# Patient Record
Sex: MALE | Race: WHITE | Employment: OTHER | URBAN - METROPOLITAN AREA
[De-identification: names, ages, dates, MRNs, and addresses within clinical notes are randomized per-mention and may not be internally consistent; named-entity substitution may affect disease eponyms.]

---

## 2023-03-17 DIAGNOSIS — N40.1 BENIGN PROSTATIC HYPERPLASIA WITH LOWER URINARY TRACT SYMPTOMS, SYMPTOM DETAILS UNSPECIFIED: ICD-10-CM

## 2023-03-17 DIAGNOSIS — I10 HYPERTENSION, UNSPECIFIED TYPE: Primary | ICD-10-CM

## 2023-03-17 DIAGNOSIS — K21.9 GASTROESOPHAGEAL REFLUX DISEASE, UNSPECIFIED WHETHER ESOPHAGITIS PRESENT: ICD-10-CM

## 2023-03-24 RX ORDER — VALSARTAN AND HYDROCHLOROTHIAZIDE 320; 12.5 MG/1; MG/1
TABLET, FILM COATED ORAL
Qty: 90 TABLET | Refills: 3 | Status: SHIPPED | OUTPATIENT
Start: 2023-03-24

## 2023-03-24 RX ORDER — OMEPRAZOLE 20 MG/1
CAPSULE, DELAYED RELEASE ORAL
Qty: 90 CAPSULE | Refills: 3 | Status: SHIPPED | OUTPATIENT
Start: 2023-03-24 | End: 2024-01-18

## 2023-03-24 RX ORDER — TAMSULOSIN HYDROCHLORIDE 0.4 MG/1
CAPSULE ORAL
Qty: 90 CAPSULE | Refills: 3 | Status: SHIPPED | OUTPATIENT
Start: 2023-03-24 | End: 2023-10-16

## 2023-05-01 ENCOUNTER — OFFICE VISIT (OUTPATIENT)
Dept: PRIMARY CARE | Facility: CLINIC | Age: 78
End: 2023-05-01
Payer: MEDICARE

## 2023-05-01 VITALS — WEIGHT: 196 LBS | SYSTOLIC BLOOD PRESSURE: 160 MMHG | DIASTOLIC BLOOD PRESSURE: 80 MMHG

## 2023-05-01 DIAGNOSIS — I10 HYPERTENSION, UNSPECIFIED TYPE: ICD-10-CM

## 2023-05-01 DIAGNOSIS — Z86.73 HX OF TIA (TRANSIENT ISCHEMIC ATTACK) AND STROKE: ICD-10-CM

## 2023-05-01 DIAGNOSIS — E11.69 TYPE 2 DIABETES MELLITUS WITH OTHER SPECIFIED COMPLICATION, WITHOUT LONG-TERM CURRENT USE OF INSULIN (MULTI): ICD-10-CM

## 2023-05-01 DIAGNOSIS — G45.9 TIA (TRANSIENT ISCHEMIC ATTACK): Primary | ICD-10-CM

## 2023-05-01 PROCEDURE — 1160F RVW MEDS BY RX/DR IN RCRD: CPT | Performed by: INTERNAL MEDICINE

## 2023-05-01 PROCEDURE — 3077F SYST BP >= 140 MM HG: CPT | Performed by: INTERNAL MEDICINE

## 2023-05-01 PROCEDURE — 99214 OFFICE O/P EST MOD 30 MIN: CPT | Performed by: INTERNAL MEDICINE

## 2023-05-01 PROCEDURE — 1036F TOBACCO NON-USER: CPT | Performed by: INTERNAL MEDICINE

## 2023-05-01 PROCEDURE — 1159F MED LIST DOCD IN RCRD: CPT | Performed by: INTERNAL MEDICINE

## 2023-05-01 PROCEDURE — 3079F DIAST BP 80-89 MM HG: CPT | Performed by: INTERNAL MEDICINE

## 2023-05-01 RX ORDER — EMPAGLIFLOZIN 10 MG/1
1 TABLET, FILM COATED ORAL DAILY
COMMUNITY
Start: 2019-06-24

## 2023-05-01 RX ORDER — EZETIMIBE 10 MG/1
10 TABLET ORAL NIGHTLY
COMMUNITY
Start: 2023-02-19

## 2023-05-01 RX ORDER — APIXABAN 5 MG/1
1 TABLET, FILM COATED ORAL 2 TIMES DAILY
COMMUNITY
Start: 2013-05-03 | End: 2023-12-12

## 2023-05-01 RX ORDER — GLIMEPIRIDE 2 MG/1
2 TABLET ORAL DAILY
COMMUNITY
Start: 2015-04-07 | End: 2023-06-19 | Stop reason: DRUGHIGH

## 2023-05-01 RX ORDER — BLOOD SUGAR DIAGNOSTIC
STRIP MISCELLANEOUS
COMMUNITY
End: 2023-06-16 | Stop reason: SDUPTHER

## 2023-05-01 RX ORDER — ROSUVASTATIN CALCIUM 20 MG/1
1 TABLET, COATED ORAL NIGHTLY
COMMUNITY
Start: 2018-07-17

## 2023-05-01 RX ORDER — ATENOLOL 50 MG/1
1 TABLET ORAL NIGHTLY
COMMUNITY
Start: 2017-02-27 | End: 2023-08-08

## 2023-05-01 ASSESSMENT — ENCOUNTER SYMPTOMS
DEPRESSION: 0
LOSS OF SENSATION IN FEET: 0
OCCASIONAL FEELINGS OF UNSTEADINESS: 0

## 2023-05-01 NOTE — PROGRESS NOTES
Subjective   Patient ID: Evangelist Infante is a 77 y.o. male who presents for Cerebrovascular Accident (Patient states last Friday at dinner his daughter stated his face was drooping on the right side.).    HPI   77 years old male comes in to see me with the following complaint.  On Friday night he was eating at the restaurant with his daughter and drinking glass of vodka, at the end of the day he felt fine and try to stand up and walk to leave but he had to struggle and his daughter noticed left facial drooping.  He was able to speak did not lose his speech.  Did not feel obviously tired or weak.  This event happened twice in a month so far.  He went home which is 5 minutes from the restaurant and he felt normal and back to his baseline.  Acute care Evangelist for his hypertension, atrial fibrillation, diabetes mellitus type 2, hyperlipidemia and BPH.  Review of Systems  12 system reviewed and 12 systems are negative.  Still feeling weak or different feeling on his left side which is the side of the stroke.  He has normal speech.  He denies any headache or lightheadedness.  No new neurological symptoms since that event on Friday night few days ago.  Objective   /80 (BP Location: Right arm, Patient Position: Sitting, BP Cuff Size: Adult)   Wt 88.9 kg (196 lb)     Physical Exam  Was alert oriented in no distress his usual way.  Nonicteric sclera or jaundice.  Face symmetrical cranial nerves intact neck supple no masses no lymph no thyromegaly or jugular venous distention.  Lungs clear no rales wheezing or crackles.  Heart normal S1 and S2 regular rhythm.  Abdomen benign neurologically the same equal strength on upper extremities right and left.  Weaker on the left leg.  Normal speech no cranial nerves deficits.  Good sensory system on both hands.  We agreed to follow the plan of consulting neurology.  Ordering an ultrasound of the carotids.  Possibly need a CAT scan or an MRI MRA.  Starting on aspirin 81 mg even  though he is taking Eliquis 5 mg twice a day and this happened after consulting with cardiology this morning.  It happened during his visit here in the office.  We were concerned about TIAs and he understands that.  Hopefully he can see neurology soon start the aspirin today.  81 mg.  Assessment/Plan   Diagnoses and all orders for this visit:  TIA (transient ischemic attack)  -     Referral to Neurology; Future  -     Vascular US carotid artery duplex bilateral; Future  Hx of TIA (transient ischemic attack) and stroke  -     Vascular US carotid artery duplex bilateral; Future  Type 2 diabetes mellitus with other specified complication, without long-term current use of insulin (CMS/[facility])  Hypertension, unspecified type

## 2023-06-14 DIAGNOSIS — E11.9 TYPE 2 DIABETES MELLITUS WITHOUT COMPLICATIONS (MULTI): ICD-10-CM

## 2023-06-16 DIAGNOSIS — E11.69 TYPE 2 DIABETES MELLITUS WITH OTHER SPECIFIED COMPLICATION, WITHOUT LONG-TERM CURRENT USE OF INSULIN (MULTI): Primary | ICD-10-CM

## 2023-06-16 RX ORDER — BLOOD SUGAR DIAGNOSTIC
STRIP MISCELLANEOUS
Qty: 100 STRIP | Refills: 3 | Status: SHIPPED | OUTPATIENT
Start: 2023-06-16 | End: 2023-10-16

## 2023-06-16 RX ORDER — BLOOD SUGAR DIAGNOSTIC
STRIP MISCELLANEOUS
Qty: 100 STRIP | Refills: 4 | Status: SHIPPED | OUTPATIENT
Start: 2023-06-16

## 2023-06-19 ENCOUNTER — TELEPHONE (OUTPATIENT)
Dept: PRIMARY CARE | Facility: CLINIC | Age: 78
End: 2023-06-19
Payer: MEDICARE

## 2023-06-19 DIAGNOSIS — E11.69 TYPE 2 DIABETES MELLITUS WITH OTHER SPECIFIED COMPLICATION, WITHOUT LONG-TERM CURRENT USE OF INSULIN (MULTI): Primary | ICD-10-CM

## 2023-06-19 RX ORDER — BLOOD SUGAR DIAGNOSTIC
100 STRIP MISCELLANEOUS 2 TIMES DAILY
COMMUNITY
Start: 2015-05-06

## 2023-06-19 RX ORDER — GLIMEPIRIDE 2 MG/1
2 TABLET ORAL 2 TIMES DAILY
COMMUNITY

## 2023-06-19 RX ORDER — GLIMEPIRIDE 2 MG/1
TABLET ORAL
Qty: 180 TABLET | Refills: 3 | Status: SHIPPED | OUTPATIENT
Start: 2023-06-19

## 2023-06-19 NOTE — TELEPHONE ENCOUNTER
VERY ANGRY MAN-- ADI TIRADO- 1945   HAS CALLED 4 TIMES FOR MEDICATION AND NEEDS SENT ASAP  1) ONE TOUCH ULTRA TEST STRIPS-100 TEST- 4 REFILLS  [facility], [phone]

## 2023-06-23 ENCOUNTER — TELEPHONE (OUTPATIENT)
Dept: PRIMARY CARE | Facility: CLINIC | Age: 78
End: 2023-06-23

## 2023-06-26 DIAGNOSIS — I10 HYPERTENSION, UNSPECIFIED TYPE: Primary | ICD-10-CM

## 2023-07-01 RX ORDER — ATENOLOL 100 MG/1
TABLET ORAL
Qty: 90 TABLET | Refills: 3 | Status: SHIPPED | OUTPATIENT
Start: 2023-07-01 | End: 2024-01-18

## 2023-08-07 DIAGNOSIS — I25.10 CORONARY ARTERY DISEASE INVOLVING NATIVE CORONARY ARTERY OF NATIVE HEART, UNSPECIFIED WHETHER ANGINA PRESENT: Primary | ICD-10-CM

## 2023-08-08 RX ORDER — ATENOLOL 50 MG/1
50 TABLET ORAL NIGHTLY
Qty: 90 TABLET | Refills: 3 | Status: SHIPPED | OUTPATIENT
Start: 2023-08-08 | End: 2024-01-18

## 2023-08-28 ENCOUNTER — TELEPHONE (OUTPATIENT)
Dept: PRIMARY CARE | Facility: CLINIC | Age: 78
End: 2023-08-28
Payer: MEDICARE

## 2023-08-28 DIAGNOSIS — I10 HYPERTENSION, UNSPECIFIED TYPE: ICD-10-CM

## 2023-08-28 RX ORDER — VALSARTAN 160 MG/1
160 TABLET ORAL DAILY
Qty: 90 TABLET | Refills: 3 | Status: SHIPPED | OUTPATIENT
Start: 2023-08-28 | End: 2023-10-17 | Stop reason: SDUPTHER

## 2023-09-05 ENCOUNTER — TELEPHONE (OUTPATIENT)
Dept: PRIMARY CARE | Facility: CLINIC | Age: 78
End: 2023-09-05
Payer: MEDICARE

## 2023-09-07 ENCOUNTER — OFFICE VISIT (OUTPATIENT)
Dept: PRIMARY CARE | Facility: CLINIC | Age: 78
End: 2023-09-07
Payer: MEDICARE

## 2023-09-07 VITALS
WEIGHT: 176.2 LBS | BODY MASS INDEX: 25.28 KG/M2 | TEMPERATURE: 96.8 F | SYSTOLIC BLOOD PRESSURE: 110 MMHG | DIASTOLIC BLOOD PRESSURE: 70 MMHG

## 2023-09-07 DIAGNOSIS — R19.7 DIARRHEA OF PRESUMED INFECTIOUS ORIGIN: Primary | ICD-10-CM

## 2023-09-07 DIAGNOSIS — A04.72 C. DIFFICILE DIARRHEA: ICD-10-CM

## 2023-09-07 DIAGNOSIS — L97.511 ULCER OF RIGHT FOOT, LIMITED TO BREAKDOWN OF SKIN (MULTI): ICD-10-CM

## 2023-09-07 DIAGNOSIS — D64.9 ANEMIA, UNSPECIFIED TYPE: ICD-10-CM

## 2023-09-07 DIAGNOSIS — I10 HYPERTENSION, UNSPECIFIED TYPE: ICD-10-CM

## 2023-09-07 DIAGNOSIS — R63.4 WEIGHT LOSS, ABNORMAL: ICD-10-CM

## 2023-09-07 PROCEDURE — 99214 OFFICE O/P EST MOD 30 MIN: CPT | Performed by: INTERNAL MEDICINE

## 2023-09-07 PROCEDURE — 1160F RVW MEDS BY RX/DR IN RCRD: CPT | Performed by: INTERNAL MEDICINE

## 2023-09-07 PROCEDURE — 80048 BASIC METABOLIC PNL TOTAL CA: CPT | Performed by: INTERNAL MEDICINE

## 2023-09-07 PROCEDURE — 1126F AMNT PAIN NOTED NONE PRSNT: CPT | Performed by: INTERNAL MEDICINE

## 2023-09-07 PROCEDURE — 3074F SYST BP LT 130 MM HG: CPT | Performed by: INTERNAL MEDICINE

## 2023-09-07 PROCEDURE — 3078F DIAST BP <80 MM HG: CPT | Performed by: INTERNAL MEDICINE

## 2023-09-07 PROCEDURE — 1159F MED LIST DOCD IN RCRD: CPT | Performed by: INTERNAL MEDICINE

## 2023-09-07 PROCEDURE — 1036F TOBACCO NON-USER: CPT | Performed by: INTERNAL MEDICINE

## 2023-09-07 PROCEDURE — 85025 COMPLETE CBC W/AUTO DIFF WBC: CPT | Performed by: INTERNAL MEDICINE

## 2023-09-07 ASSESSMENT — PATIENT HEALTH QUESTIONNAIRE - PHQ9
2. FEELING DOWN, DEPRESSED OR HOPELESS: NOT AT ALL
1. LITTLE INTEREST OR PLEASURE IN DOING THINGS: NOT AT ALL
SUM OF ALL RESPONSES TO PHQ9 QUESTIONS 1 AND 2: 0

## 2023-09-07 ASSESSMENT — PAIN SCALES - GENERAL: PAINLEVEL: 0-NO PAIN

## 2023-09-07 NOTE — PROGRESS NOTES
Subjective   Patient ID: Evangelist Infante is a 78 y.o. male who presents for Follow-up (Foot surgery ), Results, and Diarrhea.    HPI   78 years old male comes in to see me sitting in the wheelchair because of the recent surgery done on his right foot at [facility] by Dr. Oates.  He was treated with antibiotic for a while.  Now he is complaining of diarrhea which is persistent for few days.  His stools looks like most.  He complained of diarrhea as he said and he is not taking any precaution when it comes to diet.  We spent time discussing the situation with him and his wife concerning clear liquid diet.  Explained to them that we need to check him for C. difficile and for that purpose we need a stool specimen call should at [facility].  Blood test was done for CBC and a BMP.  He lost a lot of weight.  According to him 15 pounds.  Review of Systems  Review of system -12 of them except for diarrhea and weight loss.  He goes to the podiatrist once a week for dressing change on his right foot.  He a quarter sized ulcer on his arch area right foot just below the right big toe    During his hospital stay at [place] there was a note stating they discovered a lung nodule.  His wife and himself states they found a nodule on his gallbladder.  He was supposed to see a surgeon Dr. Massey for that reason.  Medications are valsartan 160 mg a day.  Amlodipine 2.5 mg a day.  Eliquis 5 mg twice a day.  Atenolol 100 mg a day.  Atorvastatin 40 mg a day.  Omeprazole 20 mg a day and tamsulosin 0.4 mg a day.  Jardiance was put on hold and as we could see there was some changes in his medication.  Objective   /70   Temp 36 °C (96.8 °F) (Temporal)   Wt 79.9 kg (176 lb 3.2 oz)   BMI 25.28 kg/m²     Physical Exam  Alert oriented in no distress.  Lost weight visible on his face.  The dressing on his right foot.  Nonicteric sclera or jaundice.  Face symmetrical cranial nerves intact.  Lungs clear no rales no  wheezing.  Heart normal S1 and S2 regular rhythm.  Abdomen benign neurologically intact.  Neck supple no masses no lymph no thyromegaly or jugular venous distention.  Work-up for C. difficile toxin on the stools culture to be done at [facility] with the order written.  CBC and BMP done.  Assessment/Plan   Diagnoses and all orders for this visit:  Diarrhea of presumed infectious origin  -     C. difficile, PCR; Future  Anemia, unspecified type  -     CBC  Hypertension, unspecified type  -     Basic Metabolic Panel  C. difficile diarrhea  Ulcer of right foot, limited to breakdown of skin (CMS/HCC)  Weight loss, abnormal

## 2023-09-08 ENCOUNTER — LAB (OUTPATIENT)
Dept: LAB | Facility: LAB | Age: 78
End: 2023-09-08
Payer: MEDICARE

## 2023-09-08 DIAGNOSIS — R19.7 DIARRHEA OF PRESUMED INFECTIOUS ORIGIN: ICD-10-CM

## 2023-09-15 ENCOUNTER — TELEPHONE (OUTPATIENT)
Dept: PRIMARY CARE | Facility: CLINIC | Age: 78
End: 2023-09-15

## 2023-09-15 ENCOUNTER — LAB (OUTPATIENT)
Dept: LAB | Facility: LAB | Age: 78
End: 2023-09-15
Payer: MEDICARE

## 2023-09-15 DIAGNOSIS — A09 DIARRHEA OF INFECTIOUS ORIGIN: ICD-10-CM

## 2023-09-15 DIAGNOSIS — A09 DIARRHEA OF INFECTIOUS ORIGIN: Primary | ICD-10-CM

## 2023-09-15 DIAGNOSIS — A04.72 C. DIFFICILE DIARRHEA: Primary | ICD-10-CM

## 2023-09-15 PROCEDURE — 87493 C DIFF AMPLIFIED PROBE: CPT

## 2023-09-15 RX ORDER — VANCOMYCIN HYDROCHLORIDE 125 MG/1
125 CAPSULE ORAL 4 TIMES DAILY
Qty: 40 CAPSULE | Refills: 0 | Status: SHIPPED | OUTPATIENT
Start: 2023-09-15 | End: 2023-10-01 | Stop reason: SDUPTHER

## 2023-09-16 ENCOUNTER — DOCUMENTATION (OUTPATIENT)
Dept: PRIMARY CARE | Facility: CLINIC | Age: 78
End: 2023-09-16
Payer: MEDICARE

## 2023-09-16 LAB — C. DIFFICILE TOXIN, PCR: DETECTED

## 2023-09-16 NOTE — PROGRESS NOTES
I called the patient now has's C. difficile culture came back detected and positive.  Already put vancomycin 125 mg 4 times a day last night and the wife is now at the pharmacy picking it up he will start on it right away 4 times a day for 10 days and I will follow his progression you may eat anything you want to eat.  It is contagious.  Make sure you wipe clean your hands keep your silverware separate from the rest of the family and the children should stay away from you you are contagious as we speak careful with the wife and all the visitors clean the house disimpacted good luck

## 2023-09-25 DIAGNOSIS — E78.5 HYPERLIPIDEMIA, UNSPECIFIED HYPERLIPIDEMIA TYPE: Primary | ICD-10-CM

## 2023-09-25 RX ORDER — ATORVASTATIN CALCIUM 40 MG/1
40 TABLET, FILM COATED ORAL DAILY
Qty: 90 TABLET | Refills: 3 | Status: SHIPPED | OUTPATIENT
Start: 2023-09-25 | End: 2024-04-26

## 2023-10-01 DIAGNOSIS — A04.72 C. DIFFICILE DIARRHEA: ICD-10-CM

## 2023-10-01 RX ORDER — VANCOMYCIN HYDROCHLORIDE 125 MG/1
125 CAPSULE ORAL 4 TIMES DAILY
Qty: 40 CAPSULE | Refills: 1 | Status: SHIPPED | OUTPATIENT
Start: 2023-10-01 | End: 2023-10-24 | Stop reason: SDUPTHER

## 2023-10-15 DIAGNOSIS — E11.69 TYPE 2 DIABETES MELLITUS WITH OTHER SPECIFIED COMPLICATION, WITHOUT LONG-TERM CURRENT USE OF INSULIN (MULTI): ICD-10-CM

## 2023-10-15 DIAGNOSIS — N40.1 BENIGN PROSTATIC HYPERPLASIA WITH LOWER URINARY TRACT SYMPTOMS, SYMPTOM DETAILS UNSPECIFIED: ICD-10-CM

## 2023-10-16 RX ORDER — TAMSULOSIN HYDROCHLORIDE 0.4 MG/1
CAPSULE ORAL
Qty: 100 CAPSULE | Refills: 2 | Status: SHIPPED | OUTPATIENT
Start: 2023-10-16

## 2023-10-16 RX ORDER — BLOOD SUGAR DIAGNOSTIC
STRIP MISCELLANEOUS
Qty: 200 STRIP | Refills: 2 | Status: SHIPPED | OUTPATIENT
Start: 2023-10-16

## 2023-10-17 ENCOUNTER — TELEPHONE (OUTPATIENT)
Dept: PRIMARY CARE | Facility: CLINIC | Age: 78
End: 2023-10-17
Payer: MEDICARE

## 2023-10-17 DIAGNOSIS — I10 HYPERTENSION, UNSPECIFIED TYPE: ICD-10-CM

## 2023-10-18 RX ORDER — VALSARTAN 160 MG/1
160 TABLET ORAL DAILY
Qty: 90 TABLET | Refills: 3 | Status: SHIPPED | OUTPATIENT
Start: 2023-10-18 | End: 2024-10-17

## 2023-10-24 ENCOUNTER — TELEPHONE (OUTPATIENT)
Dept: PRIMARY CARE | Facility: CLINIC | Age: 78
End: 2023-10-24
Payer: MEDICARE

## 2023-10-24 DIAGNOSIS — A04.72 C. DIFFICILE DIARRHEA: ICD-10-CM

## 2023-10-24 NOTE — TELEPHONE ENCOUNTER
patient called requeted 10 day supply of Vancomycin for his C-Diff problem until he see Gastroenterologist

## 2023-10-25 RX ORDER — VANCOMYCIN HYDROCHLORIDE 125 MG/1
125 CAPSULE ORAL 4 TIMES DAILY
Qty: 40 CAPSULE | Refills: 0 | Status: SHIPPED | OUTPATIENT
Start: 2023-10-25 | End: 2023-11-04

## 2023-11-05 PROBLEM — N40.1 BENIGN PROSTATIC HYPERPLASIA (BPH) WITH STRAINING ON URINATION: Status: ACTIVE | Noted: 2023-11-05

## 2023-11-05 PROBLEM — G45.9 TIA (TRANSIENT ISCHEMIC ATTACK): Status: ACTIVE | Noted: 2023-11-05

## 2023-11-05 PROBLEM — K21.9 GASTROESOPHAGEAL REFLUX DISEASE: Status: ACTIVE | Noted: 2017-12-19

## 2023-11-05 PROBLEM — E08.621: Status: ACTIVE | Noted: 2017-12-06

## 2023-11-05 PROBLEM — S93.125A: Status: ACTIVE | Noted: 2017-11-15

## 2023-11-05 PROBLEM — L89.92: Status: ACTIVE | Noted: 2021-06-16

## 2023-11-05 PROBLEM — E11.9 DIABETES MELLITUS (MULTI): Status: ACTIVE | Noted: 2023-08-18

## 2023-11-05 PROBLEM — I48.91 ATRIAL FIBRILLATION (MULTI): Status: ACTIVE | Noted: 2017-12-19

## 2023-11-05 PROBLEM — I73.9 PVD (PERIPHERAL VASCULAR DISEASE) (CMS-HCC): Status: ACTIVE | Noted: 2023-11-05

## 2023-11-05 PROBLEM — M21.969 FOOT DEFORMITY: Status: ACTIVE | Noted: 2023-11-05

## 2023-11-05 PROBLEM — E78.00 HIGH CHOLESTEROL: Status: ACTIVE | Noted: 2023-11-05

## 2023-11-05 PROBLEM — I69.993 CVA, OLD, ATAXIA: Status: ACTIVE | Noted: 2023-11-05

## 2023-11-05 PROBLEM — R39.16 BENIGN PROSTATIC HYPERPLASIA (BPH) WITH STRAINING ON URINATION: Status: ACTIVE | Noted: 2023-11-05

## 2023-11-05 PROBLEM — I77.819 AORTIC DILATATION (CMS-HCC): Status: ACTIVE | Noted: 2023-11-05

## 2023-11-05 PROBLEM — D51.0 ANEMIA, PERNICIOUS: Status: ACTIVE | Noted: 2023-11-05

## 2023-11-05 PROBLEM — R26.2 DIFFICULTY WALKING: Status: ACTIVE | Noted: 2023-11-05

## 2023-11-05 PROBLEM — I10 BENIGN ESSENTIAL HYPERTENSION: Status: ACTIVE | Noted: 2017-12-19

## 2023-11-05 PROBLEM — I69.354 HEMIPARESIS OF LEFT NONDOMINANT SIDE AS LATE EFFECT OF CEREBRAL INFARCTION (MULTI): Status: ACTIVE | Noted: 2023-11-05

## 2023-11-05 PROBLEM — L97.412: Status: ACTIVE | Noted: 2017-12-06

## 2023-11-05 RX ORDER — AMLODIPINE BESYLATE 2.5 MG/1
2.5 TABLET ORAL
COMMUNITY

## 2023-11-07 ENCOUNTER — OFFICE VISIT (OUTPATIENT)
Dept: GASTROENTEROLOGY | Facility: HOSPITAL | Age: 78
End: 2023-11-07
Payer: MEDICARE

## 2023-11-07 VITALS — BODY MASS INDEX: 26.46 KG/M2 | HEIGHT: 71 IN | WEIGHT: 189 LBS

## 2023-11-07 DIAGNOSIS — A04.72 CLOSTRIDIUM DIFFICILE DIARRHEA: Primary | ICD-10-CM

## 2023-11-07 PROCEDURE — 1126F AMNT PAIN NOTED NONE PRSNT: CPT

## 2023-11-07 PROCEDURE — 99203 OFFICE O/P NEW LOW 30 MIN: CPT

## 2023-11-07 PROCEDURE — 1160F RVW MEDS BY RX/DR IN RCRD: CPT

## 2023-11-07 PROCEDURE — 99213 OFFICE O/P EST LOW 20 MIN: CPT

## 2023-11-07 PROCEDURE — 3074F SYST BP LT 130 MM HG: CPT

## 2023-11-07 PROCEDURE — 1036F TOBACCO NON-USER: CPT

## 2023-11-07 PROCEDURE — 3078F DIAST BP <80 MM HG: CPT

## 2023-11-07 PROCEDURE — 1159F MED LIST DOCD IN RCRD: CPT

## 2023-11-07 RX ORDER — VANCOMYCIN HYDROCHLORIDE 125 MG/1
125 CAPSULE ORAL 4 TIMES DAILY
Qty: 40 CAPSULE | Refills: 0 | Status: SHIPPED | OUTPATIENT
Start: 2023-11-07 | End: 2023-11-17 | Stop reason: SDUPTHER

## 2023-11-07 RX ORDER — SYRING-NEEDL,DISP,INSUL,0.3 ML 29 G X1/2"
296 SYRINGE, EMPTY DISPOSABLE MISCELLANEOUS ONCE
Qty: 296 ML | Refills: 0 | Status: SHIPPED | OUTPATIENT
Start: 2023-11-07 | End: 2023-11-08

## 2023-11-07 RX ORDER — FECAL MICROBIOTA SPORES, LIVE-BRPK 30000000 [CFU]/1
1 CAPSULE ORAL 4 TIMES DAILY
Qty: 12 CAPSULE | Refills: 0 | Status: SHIPPED | OUTPATIENT
Start: 2023-11-07 | End: 2023-11-08

## 2023-11-07 ASSESSMENT — ENCOUNTER SYMPTOMS
APPETITE CHANGE: 0
CONSTIPATION: 0
NAUSEA: 0
RECTAL PAIN: 0
FEVER: 0
VOMITING: 0
BLOOD IN STOOL: 0
COUGH: 0
ANAL BLEEDING: 0
CHILLS: 0
DIARRHEA: 1
FATIGUE: 0
ABDOMINAL DISTENTION: 0
TROUBLE SWALLOWING: 0
ABDOMINAL PAIN: 0
SHORTNESS OF BREATH: 0

## 2023-11-07 NOTE — PATIENT INSTRUCTIONS
Please take 10 day prescription of vancomycin I sent you.  Then 1-3 days later you will take Vowst. The night prior cleanse bowels with 10 oz magnesium citrate, then 8 hrs later or the next morning, start 4 capsules Vowst on empty stomach for 3 days    Follow up within 1 month

## 2023-11-07 NOTE — PROGRESS NOTES
Subjective     History of Present Illness:   Evangelist Infante is a 78 y.o. male with PMHx of aortic dilation s/p AAA repair, A-fib on Eliquis, HTN, HLD, PVD, diabetes, GERD, pernicious anemia, CVA  who presents to GI clinic for further evaluation recurrent C. Difficile  9/2023 C. difficile positive.  Treated with vancomycin 10/2023    Today, patient states he had recent antibiotics for RLE ulcer and then developed C.Diff 2 months ago.  Dr. Kong has filled Vancomycin 4 x for   C. Diff. Currently moving bowel 2-3 times daily and stool is more formed.  After 2-3 days off antibiotics, develops C. Diff diarrhea again and bloating.  Denies constipation, dyspepsia, melena, hematochezia, dysphagia, unintentional weight loss    Denies ETOH, smoking, marijuana  Denies fxh GI cancer or IBD  Abdominal Surgeries: Denies    Last colonoscopy 5/2001 Dr. Mcneill: Internal hemorrhoids, nonbleeding AVM cecum, four 6 mm polyps transverse and a sending.  No repeat recommended.  Pathology: Hyperplastic and 3 tubular adenomatous polyps  Denies history of EGD       Past Medical History  As per HPI.     Social History  he  reports that he has never smoked. He has never used smokeless tobacco.     Family History  his family history includes COPD in his father; Coronary artery disease in his mother; Liver cancer in his brother; esophagus cancer in his mother.     Review of Systems  Review of Systems   Constitutional:  Negative for appetite change, chills, fatigue and fever.   HENT:  Negative for trouble swallowing.    Respiratory:  Negative for cough and shortness of breath.    Gastrointestinal:  Positive for diarrhea. Negative for abdominal distention, abdominal pain, anal bleeding, blood in stool, constipation, nausea, rectal pain and vomiting.       Allergies  No Known Allergies    Medications  Current Outpatient Medications   Medication Instructions    amLODIPine (NORVASC) 2.5 mg, oral, Daily RT    atenolol (Tenormin) 100 mg tablet TAKE 1  "TABLET BY MOUTH IN  THE MORNING    atenolol (TENORMIN) 50 mg, oral, Nightly    atorvastatin (LIPITOR) 40 mg, oral, Daily    Eliquis 5 mg tablet 1 tablet, oral, 2 times daily    ezetimibe (ZETIA) 10 mg, oral, Nightly    fecal microbio spore,live-brpk (Vowst) capsule 1 capsule, oral, 4 times daily, Complete Vancomycin. Then wait 1-3 days.  The night prior, drink 10 oz magnesium citrate at least 8 hours prior to Vowst. Start Vowst the next day:take 4 capsules on an empty stomach before 1st meal of the day    glimepiride (Amaryl) 2 mg tablet TAKE 2 TABLETS BY MOUTH  DAILY    glimepiride (AMARYL) 2 mg, oral, 2 times daily    Jardiance 10 mg 1 tablet, oral, Daily    magnesium citrate solution 296 mL, oral, Once    omeprazole (PriLOSEC) 20 mg DR capsule TAKE 1 CAPSULE BY MOUTH  DAILY    OneTouch Ultra Test strip USE TO TEST TWICE A DAY    OneTouch Ultra Test strip 100 strips, subcutaneous, 2 times daily    OneTouch Ultra Test strip CHECK BLOOD SUGAR TWICE DAILY    rosuvastatin (Crestor) 20 mg tablet 1 tablet, oral, Nightly    tamsulosin (Flomax) 0.4 mg 24 hr capsule TAKE 1 CAPSULE BY MOUTH DAILY    valsartan (DIOVAN) 160 mg, oral, Daily    valsartan-hydrochlorothiazide (Diovan-HCT) 320-12.5 mg tablet TAKE 1 TABLET BY MOUTH ONCE  DAILY    vancomycin (VANCOCIN) 125 mg, oral, 4 times daily        Objective   There were no vitals taken for this visit.   Physical Exam  Musculoskeletal:      Right lower leg: Edema present.           No results found for: \"WBC\", \"HGB\", \"HCT\", \"PLT\"  No results found for: \"NA\", \"K\", \"CL\", \"CO2\", \"BUN\", \"CREATININE\", \"CALCIUM\", \"PROT\", \"BILITOT\", \"ALKPHOS\", \"ALT\", \"AST\", \"GLUCOSE\"        Evangelist Infante is a 78 y.o. male who presents to GI clinic for recurrent C. difficile.    Clostridium difficile diarrhea  C. difficile is persistent and recurrent    -Continue vancomycin for 10 days  -1 to 3 days later start Vowst following 10 oz mag citrate cleanse    Follow up 1-2 months          Ruchi Daniels, " APRN-CNP

## 2023-11-07 NOTE — ASSESSMENT & PLAN NOTE
C. difficile is persistent and recurrent    -Continue vancomycin for 10 days  -1 to 3 days later start Vowst following 10 oz mag citrate cleanse    Follow up 1-2 months

## 2023-11-16 ENCOUNTER — SPECIALTY PHARMACY (OUTPATIENT)
Dept: PHARMACY | Facility: CLINIC | Age: 78
End: 2023-11-16

## 2023-11-17 DIAGNOSIS — A04.72 CLOSTRIDIUM DIFFICILE DIARRHEA: ICD-10-CM

## 2023-11-17 RX ORDER — VANCOMYCIN HYDROCHLORIDE 125 MG/1
125 CAPSULE ORAL 4 TIMES DAILY
Qty: 40 CAPSULE | Refills: 0 | Status: SHIPPED | OUTPATIENT
Start: 2023-11-17 | End: 2023-11-27

## 2023-12-12 DIAGNOSIS — I48.21 PERMANENT ATRIAL FIBRILLATION (MULTI): Primary | ICD-10-CM

## 2023-12-12 RX ORDER — APIXABAN 5 MG/1
5 TABLET, FILM COATED ORAL 2 TIMES DAILY
Qty: 200 TABLET | Refills: 2 | Status: SHIPPED | OUTPATIENT
Start: 2023-12-12

## 2023-12-19 ENCOUNTER — OFFICE VISIT (OUTPATIENT)
Dept: PRIMARY CARE | Facility: CLINIC | Age: 78
End: 2023-12-19
Payer: MEDICARE

## 2023-12-19 VITALS
TEMPERATURE: 97.1 F | DIASTOLIC BLOOD PRESSURE: 70 MMHG | BODY MASS INDEX: 26.22 KG/M2 | WEIGHT: 188 LBS | SYSTOLIC BLOOD PRESSURE: 136 MMHG

## 2023-12-19 DIAGNOSIS — E11.69 TYPE 2 DIABETES MELLITUS WITH OTHER SPECIFIED COMPLICATION, WITHOUT LONG-TERM CURRENT USE OF INSULIN (MULTI): ICD-10-CM

## 2023-12-19 DIAGNOSIS — E78.2 MIXED HYPERLIPIDEMIA: Primary | ICD-10-CM

## 2023-12-19 DIAGNOSIS — A04.72 C. DIFFICILE DIARRHEA: ICD-10-CM

## 2023-12-19 DIAGNOSIS — Z12.5 SCREENING PSA (PROSTATE SPECIFIC ANTIGEN): ICD-10-CM

## 2023-12-19 DIAGNOSIS — Z86.73 HX OF TIA (TRANSIENT ISCHEMIC ATTACK) AND STROKE: ICD-10-CM

## 2023-12-19 DIAGNOSIS — R53.83 FATIGUE, UNSPECIFIED TYPE: ICD-10-CM

## 2023-12-19 DIAGNOSIS — I10 HYPERTENSION, UNSPECIFIED TYPE: ICD-10-CM

## 2023-12-19 DIAGNOSIS — R63.4 WEIGHT LOSS, ABNORMAL: ICD-10-CM

## 2023-12-19 DIAGNOSIS — Z23 FLU VACCINE NEED: ICD-10-CM

## 2023-12-19 PROCEDURE — 1159F MED LIST DOCD IN RCRD: CPT | Performed by: INTERNAL MEDICINE

## 2023-12-19 PROCEDURE — G0103 PSA SCREENING: HCPCS | Performed by: INTERNAL MEDICINE

## 2023-12-19 PROCEDURE — 84443 ASSAY THYROID STIM HORMONE: CPT | Performed by: INTERNAL MEDICINE

## 2023-12-19 PROCEDURE — 3075F SYST BP GE 130 - 139MM HG: CPT | Performed by: INTERNAL MEDICINE

## 2023-12-19 PROCEDURE — 85025 COMPLETE CBC W/AUTO DIFF WBC: CPT | Performed by: INTERNAL MEDICINE

## 2023-12-19 PROCEDURE — 3078F DIAST BP <80 MM HG: CPT | Performed by: INTERNAL MEDICINE

## 2023-12-19 PROCEDURE — 80061 LIPID PANEL: CPT | Performed by: INTERNAL MEDICINE

## 2023-12-19 PROCEDURE — 1126F AMNT PAIN NOTED NONE PRSNT: CPT | Performed by: INTERNAL MEDICINE

## 2023-12-19 PROCEDURE — 80053 COMPREHEN METABOLIC PANEL: CPT | Performed by: INTERNAL MEDICINE

## 2023-12-19 PROCEDURE — 1036F TOBACCO NON-USER: CPT | Performed by: INTERNAL MEDICINE

## 2023-12-19 PROCEDURE — 1160F RVW MEDS BY RX/DR IN RCRD: CPT | Performed by: INTERNAL MEDICINE

## 2023-12-19 PROCEDURE — 99214 OFFICE O/P EST MOD 30 MIN: CPT | Performed by: INTERNAL MEDICINE

## 2023-12-19 ASSESSMENT — PAIN SCALES - GENERAL: PAINLEVEL: 0-NO PAIN

## 2023-12-19 ASSESSMENT — ENCOUNTER SYMPTOMS
DEPRESSION: 0
LOSS OF SENSATION IN FEET: 1
OCCASIONAL FEELINGS OF UNSTEADINESS: 1

## 2023-12-19 NOTE — PROGRESS NOTES
Subjective   Patient ID: Evangelist Infante is a 78 y.o. male who presents for Hypertension, Hypothyroidism, and Hyperlipidemia.    HPI   78 years old comes in to see me today for regular checkup.  Just recovered from a long.  Of C. difficile colitis.  Has been treated with vancomycin oral 5-6 times with no success, had to see Dr. Amisha Weston who treated him with Voust successfully so far.  He is gaining weight up to 188 pounds.  No GI symptoms so far , his medications reviewed and reconciled.  He is taking amlodipine 2.5 mg a day, Eliquis 5 mg twice a day, aspirin 81 mg a day, Tenormin or atenolol 100 mg in the morning and 50 mg at night, atorvastatin 40 mg a day, tamsulosin 0.4 mg a day, valsartan 160 mg a day, he is feeling well sleeping well and eating well.  Wound on the right leg almost healed.  Review of Systems  12 system review 12 systems are negative.  We reviewed the past testing MRI, MRA ultrasound on the carotid.  He was seen by neurology prior to his C. difficile infection and aspirin 81 mg was added to his Eliquis.  No more TIAs since.  Objective   /70 (BP Location: Right arm, Patient Position: Sitting, BP Cuff Size: Adult)   Temp 36.2 °C (97.1 °F) (Temporal)   Wt 85.3 kg (188 lb)   BMI 26.22 kg/m²     Physical Exam  Alert oriented in no acute distress pleasant cooperative.  Back to his normal weight.  In good mood and he is back to smiling and laughing and joking.  Nonicteric sclera no jaundice.  Face symmetrical cranial nerves intact.  Neck supple no masses no lymph node thyromegaly or jugular venous distention.  Lungs clear no rales wheezing or crackles.  Heart normal S1 and S2 . irregular rhythm.  Abdomen benign nontender bowel sounds present no masses no organomegaly or pain on palpations.  Neurologically intact other than the weakness on his left side arm and legs.  No signs of failure.  As we spoke about his situation I mentioned about being aware of any possible sinus infection and  bronchitis urine infection, I advised him to call Dr. Trista Lion and find out which and what antibiotic can we use to treat him, oral or IV or does not make any difference.  The wife also was made aware of that fact.  Lab results were ordered and I will see him 3 months from now.  Assessment/Plan   Diagnoses and all orders for this visit:  Mixed hyperlipidemia  -     Lipid Panel  Fatigue, unspecified type  -     Thyroid Stimulating Hormone  Screening PSA (prostate specific antigen)  -     Thyroid Stimulating Hormone  -     Prostate Specific Antigen, Screen  Hypertension, unspecified type  -     CBC  -     Comprehensive metabolic panel  Flu vaccine need  C. difficile diarrhea  Hx of TIA (transient ischemic attack) and stroke  Weight loss, abnormal  Type 2 diabetes mellitus with other specified complication, without long-term current use of insulin (CMS/[facility])

## 2023-12-26 ENCOUNTER — TELEPHONE (OUTPATIENT)
Dept: PRIMARY CARE | Facility: CLINIC | Age: 78
End: 2023-12-26
Payer: MEDICARE

## 2023-12-26 NOTE — TELEPHONE ENCOUNTER
PC:results told-Results are good   PSA tiny up (PSA 4,37) should be lower or equal 4)....... recheck level in 6 months

## 2024-01-18 DIAGNOSIS — K21.9 GASTROESOPHAGEAL REFLUX DISEASE, UNSPECIFIED WHETHER ESOPHAGITIS PRESENT: ICD-10-CM

## 2024-01-18 DIAGNOSIS — I25.10 CORONARY ARTERY DISEASE INVOLVING NATIVE CORONARY ARTERY OF NATIVE HEART, UNSPECIFIED WHETHER ANGINA PRESENT: ICD-10-CM

## 2024-01-18 DIAGNOSIS — I10 HYPERTENSION, UNSPECIFIED TYPE: ICD-10-CM

## 2024-01-18 RX ORDER — OMEPRAZOLE 20 MG/1
CAPSULE, DELAYED RELEASE ORAL
Qty: 100 CAPSULE | Refills: 2 | Status: SHIPPED | OUTPATIENT
Start: 2024-01-18

## 2024-01-18 RX ORDER — ATENOLOL 100 MG/1
TABLET ORAL
Qty: 100 TABLET | Refills: 2 | Status: SHIPPED | OUTPATIENT
Start: 2024-01-18

## 2024-01-18 RX ORDER — ATENOLOL 50 MG/1
50 TABLET ORAL NIGHTLY
Qty: 100 TABLET | Refills: 2 | Status: SHIPPED | OUTPATIENT
Start: 2024-01-18

## 2024-01-26 ENCOUNTER — LAB (OUTPATIENT)
Dept: LAB | Facility: LAB | Age: 79
End: 2024-01-26
Payer: MEDICARE

## 2024-01-26 ENCOUNTER — OFFICE VISIT (OUTPATIENT)
Dept: GASTROENTEROLOGY | Facility: CLINIC | Age: 79
End: 2024-01-26
Payer: MEDICARE

## 2024-01-26 VITALS — HEART RATE: 60 BPM | WEIGHT: 185 LBS | OXYGEN SATURATION: 98 % | HEIGHT: 71 IN | BODY MASS INDEX: 25.9 KG/M2

## 2024-01-26 DIAGNOSIS — R19.7 DIARRHEA, UNSPECIFIED TYPE: ICD-10-CM

## 2024-01-26 DIAGNOSIS — R19.7 DIARRHEA, UNSPECIFIED TYPE: Primary | ICD-10-CM

## 2024-01-26 LAB
ALBUMIN SERPL BCP-MCNC: 4.3 G/DL (ref 3.4–5)
ALP SERPL-CCNC: 80 U/L (ref 33–136)
ALT SERPL W P-5'-P-CCNC: 21 U/L (ref 10–52)
ANION GAP SERPL CALC-SCNC: 14 MMOL/L (ref 10–20)
AST SERPL W P-5'-P-CCNC: 22 U/L (ref 9–39)
BASOPHILS # BLD AUTO: 0.07 X10*3/UL (ref 0–0.1)
BASOPHILS NFR BLD AUTO: 0.9 %
BILIRUB SERPL-MCNC: 1 MG/DL (ref 0–1.2)
BUN SERPL-MCNC: 24 MG/DL (ref 6–23)
CALCIUM SERPL-MCNC: 9.9 MG/DL (ref 8.6–10.6)
CHLORIDE SERPL-SCNC: 102 MMOL/L (ref 98–107)
CO2 SERPL-SCNC: 27 MMOL/L (ref 21–32)
CREAT SERPL-MCNC: 0.98 MG/DL (ref 0.5–1.3)
EGFRCR SERPLBLD CKD-EPI 2021: 78 ML/MIN/1.73M*2
EOSINOPHIL # BLD AUTO: 0.26 X10*3/UL (ref 0–0.4)
EOSINOPHIL NFR BLD AUTO: 3.3 %
ERYTHROCYTE [DISTWIDTH] IN BLOOD BY AUTOMATED COUNT: 14.2 % (ref 11.5–14.5)
GLUCOSE SERPL-MCNC: 110 MG/DL (ref 74–99)
HCT VFR BLD AUTO: 47.7 % (ref 41–52)
HGB BLD-MCNC: 15.5 G/DL (ref 13.5–17.5)
IMM GRANULOCYTES # BLD AUTO: 0.03 X10*3/UL (ref 0–0.5)
IMM GRANULOCYTES NFR BLD AUTO: 0.4 % (ref 0–0.9)
LYMPHOCYTES # BLD AUTO: 2.18 X10*3/UL (ref 0.8–3)
LYMPHOCYTES NFR BLD AUTO: 27.3 %
MCH RBC QN AUTO: 31.1 PG (ref 26–34)
MCHC RBC AUTO-ENTMCNC: 32.5 G/DL (ref 32–36)
MCV RBC AUTO: 96 FL (ref 80–100)
MONOCYTES # BLD AUTO: 0.8 X10*3/UL (ref 0.05–0.8)
MONOCYTES NFR BLD AUTO: 10 %
NEUTROPHILS # BLD AUTO: 4.65 X10*3/UL (ref 1.6–5.5)
NEUTROPHILS NFR BLD AUTO: 58.1 %
NRBC BLD-RTO: 0 /100 WBCS (ref 0–0)
PLATELET # BLD AUTO: 181 X10*3/UL (ref 150–450)
POTASSIUM SERPL-SCNC: 4.1 MMOL/L (ref 3.5–5.3)
PROT SERPL-MCNC: 7.5 G/DL (ref 6.4–8.2)
RBC # BLD AUTO: 4.98 X10*6/UL (ref 4.5–5.9)
SODIUM SERPL-SCNC: 139 MMOL/L (ref 136–145)
WBC # BLD AUTO: 8 X10*3/UL (ref 4.4–11.3)

## 2024-01-26 PROCEDURE — 1159F MED LIST DOCD IN RCRD: CPT

## 2024-01-26 PROCEDURE — 99213 OFFICE O/P EST LOW 20 MIN: CPT

## 2024-01-26 PROCEDURE — 1036F TOBACCO NON-USER: CPT

## 2024-01-26 PROCEDURE — 80053 COMPREHEN METABOLIC PANEL: CPT

## 2024-01-26 PROCEDURE — 1126F AMNT PAIN NOTED NONE PRSNT: CPT

## 2024-01-26 PROCEDURE — 85025 COMPLETE CBC W/AUTO DIFF WBC: CPT

## 2024-01-26 PROCEDURE — 36415 COLL VENOUS BLD VENIPUNCTURE: CPT

## 2024-01-26 ASSESSMENT — ENCOUNTER SYMPTOMS
ANAL BLEEDING: 0
DIARRHEA: 1
FEVER: 0
VOMITING: 0
APPETITE CHANGE: 0
SHORTNESS OF BREATH: 0
NAUSEA: 0
BLOOD IN STOOL: 0
RECTAL PAIN: 0
COUGH: 0
CONSTIPATION: 0
ABDOMINAL DISTENTION: 0
FATIGUE: 0
TROUBLE SWALLOWING: 0
CHILLS: 0
ABDOMINAL PAIN: 0

## 2024-01-26 NOTE — PATIENT INSTRUCTIONS
Please get your blood work and stool tests.  I will call you with results and we will treat you accordingly.

## 2024-01-26 NOTE — PROGRESS NOTES
Subjective     History of Present Illness:   Evangelist Infante is a 78 y.o. male with PMHx of aortic dilation s/p AAA repair, A-fib on Eliquis, HTN, HLD, PVD, diabetes, GERD, pernicious anemia, CVA   who presents to GI clinic for follow up.  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  Prescribed vancomycin followed by Vowst    Today, patient initially had improved after receiving Vowst.  Since eating at a restaurant, has had soft stools/loose for the last 10 days.  Took pepto bismal, which has helped, but they return. Stool causes fecal urgency.  Symptoms started after eating at fish house, then experience sudden diarrhea.  The first few days, had dark brown/black looking loose stools piror to taking pepto bismal.  Currently moving bowels approximately 4 times daily with brown stool.  Denies any other GI complaints today    Denies ETOH, smoking, marijuana  Denies fxh GI cancer or IBD  Abdominal Surgeries: Denies     Last colonoscopy 5/2001 Dr. Mcneill: Internal hemorrhoids, nonbleeding AVM cecum, four 6 mm polyps transverse and ascending.  No repeat recommended.  Pathology: Hyperplastic and 3 tubular adenomatous polyps  Denies history of EGD      Review of Systems  Review of Systems   Constitutional:  Negative for appetite change, chills, fatigue and fever.   HENT:  Negative for trouble swallowing.    Respiratory:  Negative for cough and shortness of breath.    Gastrointestinal:  Positive for diarrhea. Negative for abdominal distention, abdominal pain, anal bleeding, blood in stool, constipation, nausea, rectal pain and vomiting.       Allergies  No Known Allergies    Medications  Current Outpatient Medications   Medication Instructions    amLODIPine (NORVASC) 2.5 mg, oral, Daily RT    atenolol (Tenormin) 100 mg tablet TAKE 1 TABLET BY MOUTH IN THE  MORNING    atenolol (TENORMIN) 50 mg, oral, Nightly    atorvastatin (LIPITOR) 40 mg, oral, Daily    Eliquis  "5 mg, oral, 2 times daily    ezetimibe (ZETIA) 10 mg, oral, Nightly    glimepiride (Amaryl) 2 mg tablet TAKE 2 TABLETS BY MOUTH  DAILY    glimepiride (AMARYL) 2 mg, oral, 2 times daily    Jardiance 10 mg 1 tablet, oral, Daily    omeprazole (PriLOSEC) 20 mg DR capsule TAKE 1 CAPSULE BY MOUTH DAILY    OneTouch Ultra Test strip USE TO TEST TWICE A DAY    OneTouch Ultra Test strip 100 strips, subcutaneous, 2 times daily    OneTouch Ultra Test strip CHECK BLOOD SUGAR TWICE DAILY    rosuvastatin (Crestor) 20 mg tablet 1 tablet, oral, Nightly    tamsulosin (Flomax) 0.4 mg 24 hr capsule TAKE 1 CAPSULE BY MOUTH DAILY    valsartan (DIOVAN) 160 mg, oral, Daily    valsartan-hydrochlorothiazide (Diovan-HCT) 320-12.5 mg tablet TAKE 1 TABLET BY MOUTH ONCE  DAILY        Objective   Visit Vitals  Pulse 60      Physical Exam      No results found for: \"WBC\", \"HGB\", \"HCT\", \"PLT\"  No results found for: \"NA\", \"K\", \"CL\", \"CO2\", \"BUN\", \"CREATININE\", \"CALCIUM\", \"PROT\", \"BILITOT\", \"ALKPHOS\", \"ALT\", \"AST\", \"GLUCOSE\"        Evangelist Infante is a 78 y.o. male who presents to GI clinic for diarrhea.    Diarrhea  Symptoms most suggestive of infectious stool, gastroenteritis  -Stool studies ordered for C. difficile, ova parasite, PCR  -CBC, CMP       Ruchi Daniels, APRN-CNP         "

## 2024-01-26 NOTE — ASSESSMENT & PLAN NOTE
Symptoms most suggestive of infectious stool, gastroenteritis  -Stool studies ordered for C. difficile, ova parasite, PCR  -CBC, CMP

## 2024-02-04 PROCEDURE — 87329 GIARDIA AG IA: CPT

## 2024-02-04 PROCEDURE — 87328 CRYPTOSPORIDIUM AG IA: CPT

## 2024-02-04 PROCEDURE — 87506 IADNA-DNA/RNA PROBE TQ 6-11: CPT

## 2024-02-05 ENCOUNTER — LAB REQUISITION (OUTPATIENT)
Dept: LAB | Facility: HOSPITAL | Age: 79
End: 2024-02-05
Payer: MEDICARE

## 2024-02-05 DIAGNOSIS — R19.7 DIARRHEA, UNSPECIFIED: ICD-10-CM

## 2024-02-05 LAB

## 2024-02-07 NOTE — TELEPHONE ENCOUNTER
ICC Adult Note    CHIEF COMPLAINT:    Chief Complaint   Patient presents with    Sore Throat     Sore throat started yesterday. Very painful when swallowing. Denies fever    Ear Pain     Right ear pain started last night    Patient was seen wearing a mask and I was in PPE including cap goggles face mask and gloves.  Patient examined at 1120    HPI:    HPI   This is 31 year old female who presented to the immediate care with the history of the onset last night of a sore throat which have become fairly severe with pain on swallowing also right ear pain.  No cough slight nasal congestion no fevers or chills.  A slight headache but no visual disturbances no chest pain shortness of breath abdominal pain vomiting diarrhea dysuria or myalgias.  No skin rash, bleeding or bruising problems, polydipsia polyuria polyphagia.  No history of heartburn or snoring.        REVIEW OF SYSTEMS:      See above for constitutional, neurologic, eyes, ENT, respiratory, cardiovascular, GI, genitourinary, musculoskeletal, hematologic, skin, endocrine    ALLERGIES:    ALLERGIES:  No Known Allergies    CURRENT MEDICATIONS:    Current Outpatient Medications   Medication Sig Dispense Refill    cefdinir (OMNICEF) 300 MG capsule Take 1 capsule by mouth in the morning and 1 capsule in the evening. Do all this for 10 days. 20 capsule 0    predniSONE (DELTASONE) 20 MG tablet Take 2 tablets by mouth daily for 3 days. 6 tablet 0     No current facility-administered medications for this visit.       PAST MEDICAL HISTORY:    Past Medical History:   Diagnosis Date    No known problems        SURGICAL HISTORY:    Past Surgical History:   Procedure Laterality Date    No past surgeries         SOCIAL HISTORY:    Social History     Tobacco Use    Smoking status: Never    Smokeless tobacco: Never       FAMILY HISTORY:    History reviewed. No pertinent family history.    PHYSICAL EXAM:   Visit Vitals  /79   Pulse 92   Temp 97.9 °F (36.6 °C) (Temporal)  patient requested meds refills      Resp 16   Wt 63.5 kg (140 lb)   LMP 02/04/2024   SpO2 98%       Pulse oximetry is 98% on room air consider normal.   Physical Exam  Vitals reviewed.   Constitutional:       General: She is not in acute distress.     Appearance: Normal appearance. She is not ill-appearing, toxic-appearing or diaphoretic.   HENT:      Right Ear: Ear canal and external ear normal.      Left Ear: Tympanic membrane, ear canal and external ear normal.      Ears:      Comments: Right tympanic membrane erythematous and dull and somewhat bulging.  Canals and mastoids normal     Mouth/Throat:      Mouth: Mucous membranes are moist.      Pharynx: Oropharynx is clear. Posterior oropharyngeal erythema present. No oropharyngeal exudate.      Comments: Uvula is edematous and swollen and erythematous.  No tonsillar or uvular bulging or deviation no trismus.  No voice change     Neck: Normal range of motion and neck supple. No rigidity ( Jolt accentuation negative) or tenderness. No muscular tenderness.   Eyes:      Extraocular Movements: Extraocular movements intact.      Conjunctiva/sclera: Conjunctivae normal.      Pupils: Pupils are equal, round, and reactive to light.   Neck:      Comments: Jolt accentuation negative  Cardiovascular:      Rate and Rhythm: Normal rate and regular rhythm.      Pulses: Normal pulses.      Heart sounds: Normal heart sounds. No murmur heard.     No gallop.   Pulmonary:      Effort: Pulmonary effort is normal. No respiratory distress.      Breath sounds: Normal breath sounds. No stridor. No wheezing, rhonchi or rales.   Chest:      Chest wall: No tenderness.   Abdominal:      General: Abdomen is flat. Bowel sounds are normal. There is no distension.      Palpations: Abdomen is soft. There is no mass.      Tenderness: There is no abdominal tenderness. There is no right CVA tenderness, left CVA tenderness, guarding or rebound.   Musculoskeletal:         General: No swelling or tenderness.   Lymphadenopathy:       Cervical: No cervical adenopathy.   Skin:     General: Skin is warm and dry.      Capillary Refill: Capillary refill takes less than 2 seconds.      Findings: No rash.   Neurological:      Mental Status: She is alert.          RADIOLOGY AND LAB RESULTS:    Results for orders placed or performed in visit on 02/07/24   POCT COVID/Flu/RSV Panel via [facility]   Result Value    POCT Rapid SARS-COV-2 by PCR Not Detected    POCT Influenza A by PCR Not Detected    POCT Influenza B By PCR Not Detected    RSV By PCR Not Detected   POCT Streptococcus Group A PCR   Result Value    STREPTOCOCCUS GROUP A PCR Not Detected    TEST LOT NUMBER [id number]    TEST LOT EXPIRATION DATE 10/13/24       No orders to display       No image results found.         Procedure:  [unfilled]      MEDICAL DECISION MAKING: Concerns for strep and for COVID influenza RSV-PCR was obtained for these infections and all negative.  Patient appears to have right otitis media and uvulitis.  Will treat with Omnicef and prednisone and have follow-up with [facility].  Patient is comfortable with this plan      DIAGNOSIS:    ED Diagnosis   1. Uvulitis  SERVICE TO INTERNAL MEDICINE      2. Right acute suppurative otitis media  SERVICE TO INTERNAL MEDICINE      3. Suspected COVID-19 virus infection  POCT COVID/Flu/RSV Panel via [employer]    POCT Streptococcus Group A PCR           PLAN:        Laith Hall MD  2/7/2024

## 2024-02-08 LAB
CRYPTOSP AG STL QL IA: NEGATIVE
G LAMBLIA AG STL QL IA: NEGATIVE
O+P STL MICRO: NEGATIVE

## 2024-02-09 ENCOUNTER — TELEPHONE (OUTPATIENT)
Dept: PRIMARY CARE | Facility: CLINIC | Age: 79
End: 2024-02-09
Payer: MEDICARE

## 2024-02-09 NOTE — TELEPHONE ENCOUNTER
----- Message from Cooper Kong MD sent at 2/9/2024  8:08 AM EST -----  Regarding: r  Stools cultures negative, how is he doing?   Any more GI symptoms???

## 2024-02-09 NOTE — TELEPHONE ENCOUNTER
Patient says he is doing better no more symptoms.  Thank you for checking on him, very appreciative.

## 2024-03-12 ENCOUNTER — TELEPHONE (OUTPATIENT)
Dept: GASTROENTEROLOGY | Facility: HOSPITAL | Age: 79
End: 2024-03-12
Payer: MEDICARE

## 2024-03-12 NOTE — TELEPHONE ENCOUNTER
Results reviewed with patient.  He reports having normal stools now.  Diarrhea just lasted a few days and stool studies were all negative.  Recommended he continue daily probiotic and to report diarrhea immediately if it recurs.  Patient is in agreement with this plan  ----- Message from Brianne Smallwood CMA sent at 3/12/2024  1:47 PM EDT -----  Regarding: results  Wants to hear from you about his results and what to do next    [phone]

## 2024-04-25 DIAGNOSIS — E78.5 HYPERLIPIDEMIA, UNSPECIFIED HYPERLIPIDEMIA TYPE: ICD-10-CM

## 2024-04-26 RX ORDER — ATORVASTATIN CALCIUM 40 MG/1
40 TABLET, FILM COATED ORAL DAILY
Qty: 100 TABLET | Refills: 2 | Status: SHIPPED | OUTPATIENT
Start: 2024-04-26

## 2024-04-30 ENCOUNTER — HOSPITAL ENCOUNTER (OUTPATIENT)
Dept: RADIOLOGY | Facility: CLINIC | Age: 79
Discharge: HOME | End: 2024-04-30
Payer: MEDICARE

## 2024-04-30 ENCOUNTER — OFFICE VISIT (OUTPATIENT)
Dept: PRIMARY CARE | Facility: CLINIC | Age: 79
End: 2024-04-30
Payer: MEDICARE

## 2024-04-30 VITALS
OXYGEN SATURATION: 94 % | DIASTOLIC BLOOD PRESSURE: 82 MMHG | TEMPERATURE: 97.3 F | SYSTOLIC BLOOD PRESSURE: 180 MMHG | WEIGHT: 190 LBS | HEART RATE: 75 BPM | BODY MASS INDEX: 26.5 KG/M2

## 2024-04-30 DIAGNOSIS — M25.432 SWOLLEN WRIST, LEFT: Primary | ICD-10-CM

## 2024-04-30 DIAGNOSIS — I10 BENIGN ESSENTIAL HYPERTENSION: ICD-10-CM

## 2024-04-30 DIAGNOSIS — S50.312A ABRASION OF LEFT ELBOW, INITIAL ENCOUNTER: ICD-10-CM

## 2024-04-30 DIAGNOSIS — S61.412A SKIN TEAR OF HAND WITHOUT COMPLICATION, LEFT, INITIAL ENCOUNTER: ICD-10-CM

## 2024-04-30 DIAGNOSIS — M25.432 SWOLLEN WRIST, LEFT: ICD-10-CM

## 2024-04-30 DIAGNOSIS — Z23 NEED FOR TETANUS BOOSTER: ICD-10-CM

## 2024-04-30 PROCEDURE — 73110 X-RAY EXAM OF WRIST: CPT | Mod: LEFT SIDE | Performed by: RADIOLOGY

## 2024-04-30 PROCEDURE — 73110 X-RAY EXAM OF WRIST: CPT | Mod: LT

## 2024-04-30 PROCEDURE — 1159F MED LIST DOCD IN RCRD: CPT | Performed by: INTERNAL MEDICINE

## 2024-04-30 PROCEDURE — 3077F SYST BP >= 140 MM HG: CPT | Performed by: INTERNAL MEDICINE

## 2024-04-30 PROCEDURE — 99214 OFFICE O/P EST MOD 30 MIN: CPT | Performed by: INTERNAL MEDICINE

## 2024-04-30 PROCEDURE — 3079F DIAST BP 80-89 MM HG: CPT | Performed by: INTERNAL MEDICINE

## 2024-04-30 PROCEDURE — 1036F TOBACCO NON-USER: CPT | Performed by: INTERNAL MEDICINE

## 2024-04-30 PROCEDURE — 1160F RVW MEDS BY RX/DR IN RCRD: CPT | Performed by: INTERNAL MEDICINE

## 2024-04-30 PROCEDURE — 90471 IMMUNIZATION ADMIN: CPT | Performed by: INTERNAL MEDICINE

## 2024-04-30 PROCEDURE — 90715 TDAP VACCINE 7 YRS/> IM: CPT | Performed by: INTERNAL MEDICINE

## 2024-04-30 ASSESSMENT — PATIENT HEALTH QUESTIONNAIRE - PHQ9
1. LITTLE INTEREST OR PLEASURE IN DOING THINGS: NOT AT ALL
SUM OF ALL RESPONSES TO PHQ9 QUESTIONS 1 AND 2: 0
2. FEELING DOWN, DEPRESSED OR HOPELESS: NOT AT ALL

## 2024-04-30 ASSESSMENT — ENCOUNTER SYMPTOMS
DEPRESSION: 0
OCCASIONAL FEELINGS OF UNSTEADINESS: 0
LOSS OF SENSATION IN FEET: 1

## 2024-04-30 NOTE — PROGRESS NOTES
Subjective   Patient ID: Evangelist Infante is a 78 y.o. male who presents for Fall (Setting the trash out on push cart. Fell in the ditch. Cut left hand witch is the weakest side of the body).    HPI   78 years old male comes in to see me accompanied by his wife with history of CVA in the past and left hemiplegia, hypertension and hyperlipidemia and diabetes mellitus type 2, history of C. difficile colitis treated.  Comes in today because yesterday at home when trying to push a 50 pound bag to the curve he lost his balance and he fell injuring his left arm.  He got superficial abrasion on his left elbow and some skin tears on top or dorsal aspect of his hand.  Also had edema or swelling on his wrist.    Review of Systems  Otherwise had no complaints and review of system was negative.  Objective   /82 (BP Location: Right arm, Patient Position: Sitting, BP Cuff Size: Adult)   Pulse 75   Temp 36.3 °C (97.3 °F) (Temporal)   Wt 86.2 kg (190 lb)   SpO2 94%   BMI 26.50 kg/m²     Physical Exam  On exam he was well.  Nonicteric sclera or jaundice.  His weight 190 pounds.  His pressure was elevated.  Nonicteric sclera no jaundice.  Face symmetrical cranial nerves intact.  Lungs clear no rales wheezing or crackles.  Heart normal S1 and S2 regular rhythm.  Abdomen benign neurologically the same with hemiplegia on the left side.  Abrasion and skin tears on his left hand and left elbow.  We cleaned his abrasions and skin tear on the dorsal aspect of the left hand and also on the left elbow.  Applied triple antibiotic and nonadhesive dressing with the gauze dressing on hand and elbow.  The wife will be changing gauze daily.  A tetanus shot was provided.  Because of the swelling of the left hand and wrist x-ray of the wrist was obtained to rule out fractures.  Assessment/Plan   Diagnoses and all orders for this visit:  Swollen wrist, left  Skin tear of hand without complication, left, initial encounter  Abrasion of left  elbow, initial encounter  Benign essential hypertension

## 2024-05-03 DIAGNOSIS — S52.501A CLOSED FRACTURE OF DISTAL END OF RIGHT RADIUS, UNSPECIFIED FRACTURE MORPHOLOGY, INITIAL ENCOUNTER: Primary | ICD-10-CM

## 2024-05-06 ENCOUNTER — HOSPITAL ENCOUNTER (OUTPATIENT)
Dept: RADIOLOGY | Facility: CLINIC | Age: 79
Discharge: HOME | End: 2024-05-06
Payer: MEDICARE

## 2024-05-06 ENCOUNTER — OFFICE VISIT (OUTPATIENT)
Dept: ORTHOPEDIC SURGERY | Facility: CLINIC | Age: 79
End: 2024-05-06
Payer: MEDICARE

## 2024-05-06 VITALS — HEIGHT: 71 IN | WEIGHT: 190 LBS | BODY MASS INDEX: 26.6 KG/M2

## 2024-05-06 DIAGNOSIS — S62.102A LEFT WRIST FRACTURE, CLOSED, INITIAL ENCOUNTER: ICD-10-CM

## 2024-05-06 DIAGNOSIS — S62.102A LEFT WRIST FRACTURE, CLOSED, INITIAL ENCOUNTER: Primary | ICD-10-CM

## 2024-05-06 PROCEDURE — 73100 X-RAY EXAM OF WRIST: CPT | Mod: LEFT SIDE | Performed by: RADIOLOGY

## 2024-05-06 PROCEDURE — 1036F TOBACCO NON-USER: CPT | Performed by: PHYSICIAN ASSISTANT

## 2024-05-06 PROCEDURE — 99204 OFFICE O/P NEW MOD 45 MIN: CPT | Performed by: PHYSICIAN ASSISTANT

## 2024-05-06 PROCEDURE — 1159F MED LIST DOCD IN RCRD: CPT | Performed by: PHYSICIAN ASSISTANT

## 2024-05-06 PROCEDURE — 73100 X-RAY EXAM OF WRIST: CPT | Mod: LT,76

## 2024-05-06 PROCEDURE — 73100 X-RAY EXAM OF WRIST: CPT | Mod: LT

## 2024-05-06 PROCEDURE — 1160F RVW MEDS BY RX/DR IN RCRD: CPT | Performed by: PHYSICIAN ASSISTANT

## 2024-05-06 RX ORDER — TRAMADOL HYDROCHLORIDE 50 MG/1
50 TABLET ORAL EVERY 6 HOURS PRN
Qty: 15 TABLET | Refills: 0 | Status: SHIPPED | OUTPATIENT
Start: 2024-05-06 | End: 2024-05-11

## 2024-05-06 ASSESSMENT — PAIN SCALES - GENERAL: PAINLEVEL_OUTOF10: 5 - MODERATE PAIN

## 2024-05-06 ASSESSMENT — PAIN DESCRIPTION - DESCRIPTORS: DESCRIPTORS: ACHING;SORE

## 2024-05-06 ASSESSMENT — PAIN - FUNCTIONAL ASSESSMENT: PAIN_FUNCTIONAL_ASSESSMENT: 0-10

## 2024-05-06 NOTE — PROGRESS NOTES
78-year-old right-hand-dominant male presents to clinic today for follow-up of a left wrist injury.  He had an injury that occurred on 4/29/2024 while he was using a cart to roll bags of close down the street when he tripped while trying to catch a bag and fell down a divot in his yard.  He landed onto the left side.  He had a lot of swelling and some skin abrasions.  He did contact his primary care provider and was seen the following day.  Wound care was gone over and x-rays were taken.  X-rays revealed a displaced left distal radius fracture.  However he states that he was not contacted about the results of the x-ray until late in the week and he has not been wearing any immobilization for the left wrist just continue with wound care.  He has not had much pain associated with this.  Denies any fevers or chills.  Patient has an extensive medical history of previous aortic aneurysm with open heart surgery, history of a stroke at age 50 and did have some left-sided deficits from this.    Patient's self reported past medical history, medications, allergies, surgical history, family and social history as well as a 10 point review of systems has been documented in the new patient intake form and scanned into the patient's electronic medical record. Pertinent findings are documented in the HPI.    Physical Examination Findings:  Constitutional: Appears well-developed and well-nourished.  Head: Normocephalic and atraumatic.  Eyes: Pupils are equal and round.  Cardiovascular: Intact distal pulses.   Respiratory: Effort normal. No respiratory distress.  Neurologic: Alert and oriented to person, place, and time.  Skin: Skin is warm and dry.  Hematologic / Lymphatic: No lymphedema, lymphangitis.  Psychiatric: normal mood and affect. Behavior is normal.   Musculoskeletal: Left upper extremity with diffuse swelling extending out to the digits.  The fingers are warm and well-perfused small abrasions with clear blood-tinged  drainage today over the dorsal aspect of the hand and lateral aspect of the left elbow.  There is no significant surrounding erythema no purulent drainage.  He has limitations to full digital finger range of motion.  Pain with deep palpation of the left wrist.  Ambulation with cane    X-rays that were obtained today on 5/6 of the left wrist reveal a volarly translated distal radius fracture as well as a likely nondisplaced ulnar neck fracture.    Impression: Left distal radius fracture, ulnar neck fracture    Plan: We had a long discussion today regarding this injury.  Given the alignment of the fracture and the volar translation there is a sharp protrusion of the distal radius over the dorsal aspect of the fracture.  We had a long discussion today given this finding there is concern for possible extensor tendon rupture I have discussed 3 different treatment options with him in length including nonoperative cast placement today, and office closed reduction with cast placement versus ORIF of the left distal radius.  We had a discussion of risks and benefits of all of these procedures today.  He does have an extensive cardiac history and is currently on a blood thinner.  After long discussion today he has elected to proceed with an office closed reduction.  Close reduction was performed with a hematoma block.    Postreduction films of the left wrist show significant improvement of alignment of the distal radial fragment with a well molded cast    We have discussed the postreduction films we are very happy with the current alignment however we did discuss risks associated that the fracture can move into its initial preinjury film position.  If this does occur and surgery is again a possibility.  He was encouraged to begin with elevation and digital finger range of motion.  If the cast becomes significantly more tight and bothersome for him at that he was instructed to contact our office immediately or if he could not  get a hold of 1 to present to the emergency room.  He is not having much pain but should take Tylenol if needed and I given him a small prescription for tramadol if needed.  If the cast becomes significantly loose then we will also recommend him coming in early to change it.  At this time we will have him return to the office in 2 weeks for x-rays of the left wrist in cast depending on cast fit.    Patient ID: Evangelist Infante is a 78 y.o. male.    Orthopedic Injury Treatment - Lower Extremity    Date/Time: 5/6/2024 3:51 PM    Performed by: Janessa Edwards PA-C  Authorized by: Janessa Edwards PA-C    Consent:     Consent obtained:  Verbal    Consent given by:  Patient    Risks, benefits, and alternatives were discussed: yes      Risks discussed:  Fracture    Alternatives discussed:  Alternative treatment  Universal protocol:     Patient identity confirmed:  Verbally with patient  Location:     Injury location: left wrist.  Anesthesia:     Anesthesia method: hematoma block.  Procedure details:     Manipulation performed: yes      Reduction successful: yes      Reduction confirmed with imaging: yes      Immobilization:  Cast    Supplies used:  Fiberglass  Post-procedure details:     Neurological function: normal      Procedure completion:  Tolerated well, no immediate complications      I have personally reviewed the OARRS report for this patient. This report is scanned into the electronic medical record. I have considered the risks of abuse, dependence, addiction and diversion.    Janessa Edwards PA-C  Department of Orthopaedic Surgery  [facility]    Dictation performed with the use of voice recognition software. Syntax and grammatical errors may exist.

## 2024-05-07 PROCEDURE — A4565 SLINGS: HCPCS | Performed by: PHYSICIAN ASSISTANT

## 2024-05-17 DIAGNOSIS — S62.102A LEFT WRIST FRACTURE, CLOSED, INITIAL ENCOUNTER: Primary | ICD-10-CM

## 2024-05-20 ENCOUNTER — OFFICE VISIT (OUTPATIENT)
Dept: ORTHOPEDIC SURGERY | Facility: CLINIC | Age: 79
End: 2024-05-20
Payer: MEDICARE

## 2024-05-20 ENCOUNTER — HOSPITAL ENCOUNTER (OUTPATIENT)
Dept: RADIOLOGY | Facility: CLINIC | Age: 79
Discharge: HOME | End: 2024-05-20
Payer: MEDICARE

## 2024-05-20 VITALS — HEIGHT: 71 IN | BODY MASS INDEX: 26.6 KG/M2 | WEIGHT: 190 LBS

## 2024-05-20 DIAGNOSIS — S52.501A CLOSED FRACTURE OF DISTAL END OF RIGHT RADIUS, UNSPECIFIED FRACTURE MORPHOLOGY, INITIAL ENCOUNTER: ICD-10-CM

## 2024-05-20 DIAGNOSIS — S62.102A LEFT WRIST FRACTURE, CLOSED, INITIAL ENCOUNTER: ICD-10-CM

## 2024-05-20 PROCEDURE — 1159F MED LIST DOCD IN RCRD: CPT | Performed by: PHYSICIAN ASSISTANT

## 2024-05-20 PROCEDURE — 1036F TOBACCO NON-USER: CPT | Performed by: PHYSICIAN ASSISTANT

## 2024-05-20 PROCEDURE — 73110 X-RAY EXAM OF WRIST: CPT | Mod: LEFT SIDE

## 2024-05-20 PROCEDURE — 73100 X-RAY EXAM OF WRIST: CPT | Mod: LT

## 2024-05-20 PROCEDURE — 1160F RVW MEDS BY RX/DR IN RCRD: CPT | Performed by: PHYSICIAN ASSISTANT

## 2024-05-20 PROCEDURE — 99213 OFFICE O/P EST LOW 20 MIN: CPT | Performed by: PHYSICIAN ASSISTANT

## 2024-05-20 ASSESSMENT — PAIN - FUNCTIONAL ASSESSMENT: PAIN_FUNCTIONAL_ASSESSMENT: NO/DENIES PAIN

## 2024-05-20 NOTE — PROGRESS NOTES
Evangelist returns to clinic today for follow-up of left distal radius fracture that occurred on 4/29/2024 and was closed reduced in the office on 5/6/2024.  Overall he is doing well his cast is comfortable however he is anxious to get out of his cast.  His superficial wounds specifically in the elbow are healing well without any issues.  No significant amount of pain.    Past medical history, medications, allergies, surgical history and review of systems have been reviewed with the patient. Pertinent changes are documented in the HPI. Otherwise they are unchanged when compared to last visit on 5/6/2024.    Physical Examination Findings:  Constitutional: Appears well-developed and well-nourished.  Head: Normocephalic and atraumatic.  Eyes: Pupils are equal and round.  Cardiovascular: Intact distal pulses.   Respiratory: Effort normal. No respiratory distress.  Neurologic: Alert and oriented to person, place, and time.  Skin: Skin is warm and dry.  Hematologic / Lymphatic: No lymphedema, lymphangitis.  Psychiatric: normal mood and affect. Behavior is normal.   Musculoskeletal: Left wrist with intact well-padded well-fitting short arm fiberglass cast.  He has mild digital finger swelling limitations to full terminal finger flexion however intact good finger flexion.  Fingers are warm and well-perfused.  Abrasion over the elbow is healing well with overlying scab no signs of any active infection.    New x-rays taken today of the left wrist in cast reveal maintained alignment from postreduction films we have discussed his x-ray results in detail today.          Impression: Left distal radius fracture    Plan: At this time we will keep his current cast on given it is well-fitting and has a excellent mold.  He was encouraged to continue with elevation and digital finger range of motion. We will have him follow-up with our office in 2 weeks for repeat x-ray of the left wrist out of cast.     Janessa Edwards PA-C  Department of  Orthopaedic Surgery  [facility]    Dictation performed with the use of voice recognition software. Syntax and grammatical errors may exist.

## 2024-05-31 DIAGNOSIS — S62.102A LEFT WRIST FRACTURE, CLOSED, INITIAL ENCOUNTER: Primary | ICD-10-CM

## 2024-06-03 ENCOUNTER — OFFICE VISIT (OUTPATIENT)
Dept: ORTHOPEDIC SURGERY | Facility: CLINIC | Age: 79
End: 2024-06-03
Payer: MEDICARE

## 2024-06-03 ENCOUNTER — HOSPITAL ENCOUNTER (OUTPATIENT)
Dept: RADIOLOGY | Facility: CLINIC | Age: 79
Discharge: HOME | End: 2024-06-03
Payer: MEDICARE

## 2024-06-03 VITALS — HEIGHT: 71 IN | BODY MASS INDEX: 26.6 KG/M2 | WEIGHT: 190 LBS

## 2024-06-03 DIAGNOSIS — S62.102A LEFT WRIST FRACTURE, CLOSED, INITIAL ENCOUNTER: ICD-10-CM

## 2024-06-03 DIAGNOSIS — S62.102A LEFT WRIST FRACTURE, CLOSED, INITIAL ENCOUNTER: Primary | ICD-10-CM

## 2024-06-03 PROCEDURE — 73110 X-RAY EXAM OF WRIST: CPT | Mod: LEFT SIDE | Performed by: RADIOLOGY

## 2024-06-03 PROCEDURE — L3908 WHO COCK-UP NONMOLDE PRE OTS: HCPCS | Performed by: ORTHOPAEDIC SURGERY

## 2024-06-03 PROCEDURE — 99213 OFFICE O/P EST LOW 20 MIN: CPT | Performed by: ORTHOPAEDIC SURGERY

## 2024-06-03 PROCEDURE — 73110 X-RAY EXAM OF WRIST: CPT | Mod: LT

## 2024-06-03 PROCEDURE — 1036F TOBACCO NON-USER: CPT | Performed by: ORTHOPAEDIC SURGERY

## 2024-06-03 ASSESSMENT — PAIN - FUNCTIONAL ASSESSMENT: PAIN_FUNCTIONAL_ASSESSMENT: NO/DENIES PAIN

## 2024-06-03 NOTE — PROGRESS NOTES
Patient returns to follow-up on her left distal radius fracture that occurred in late April.  Close reduction performed in the office in early May.  Pain is well-controlled.  He has no new complaints today.    Past medical history, medications, allergies, surgical history and review of systems have been reviewed with the patient. Pertinent changes are documented in the HPI. Otherwise they are unchanged when compared to last visit on May 20, 2024.    Physical Examination Findings:  Constitutional: Appears well-developed and well-nourished.  Head: Normocephalic and atraumatic.  Eyes: Pupils are equal and round.  Cardiovascular: Intact distal pulses.   Respiratory: Effort normal. No respiratory distress.  Neurologic: Alert and oriented to person, place, and time.  Skin: Skin is warm and dry.  Hematologic / Lymphatic: No lymphedema, lymphangitis.  Psychiatric: normal mood and affect. Behavior is normal.   Musculoskeletal: Examination reveals significantly improved swelling left hand.  Near full composite digital flexion and extension.  Minimal tenderness to palpation at the fracture site.  Mild limitations to wrist and forearm range of motion.    Review of x-rays left wrist obtained today demonstrate healing fracture of the distal radius without change in alignment.  Fracture line is still somewhat visible.    Impression: Healing fracture left distal radius.    Plan: Today the patient was transitioned into a removable splint that he can take off for hygiene and light activities.  He should continue to wear the brace while sleeping and while out of the house.  He should avoid heavy lifting weightbearing gripping and twisting type maneuvers.  Follow-up in 4 weeks for repeat clinical examination with x-rays left wrist.    Patient was prescribed a cock up splint for distal radius fracture. The patient has weakness, instability and/or deformity of their left wrist which requires stabilization from this orthosis to improve  their function.      Verbal and written instructions for the use, wear schedule, cleaning and application of this item were given.  Patient was instructed that should the brace result in increased pain, decreased sensation, increased swelling, or an overall worsening of their medical condition, to please contact our office immediately.     Orthotic management and training was provided for skin care, modifications due to healing tissues, edema changes, interruption in skin integrity, and safety precautions with the orthosis.        Tyree Maier MD    [facility] [employer]  Department of Orthopaedic Surgery  Chief of Hand and Upper Extremity Surgery  [facility]    Dictation performed with the use of voice recognition software. Syntax and grammatical errors may exist.

## 2024-06-16 DIAGNOSIS — E11.9 TYPE 2 DIABETES MELLITUS WITHOUT COMPLICATIONS (MULTI): ICD-10-CM

## 2024-06-16 RX ORDER — BLOOD SUGAR DIAGNOSTIC
STRIP MISCELLANEOUS
Qty: 100 STRIP | Refills: 4 | Status: SHIPPED | OUTPATIENT
Start: 2024-06-16

## 2024-07-05 DIAGNOSIS — S62.102A LEFT WRIST FRACTURE, CLOSED, INITIAL ENCOUNTER: Primary | ICD-10-CM

## 2024-07-08 ENCOUNTER — APPOINTMENT (OUTPATIENT)
Dept: ORTHOPEDIC SURGERY | Facility: CLINIC | Age: 79
End: 2024-07-08
Payer: MEDICARE

## 2024-07-08 ENCOUNTER — OFFICE VISIT (OUTPATIENT)
Dept: PRIMARY CARE | Facility: CLINIC | Age: 79
End: 2024-07-08
Payer: MEDICARE

## 2024-07-08 ENCOUNTER — HOSPITAL ENCOUNTER (OUTPATIENT)
Dept: RADIOLOGY | Facility: CLINIC | Age: 79
Discharge: HOME | End: 2024-07-08
Payer: MEDICARE

## 2024-07-08 VITALS
HEART RATE: 75 BPM | BODY MASS INDEX: 26.5 KG/M2 | DIASTOLIC BLOOD PRESSURE: 96 MMHG | WEIGHT: 190 LBS | SYSTOLIC BLOOD PRESSURE: 166 MMHG | OXYGEN SATURATION: 93 % | TEMPERATURE: 98.2 F

## 2024-07-08 VITALS — HEIGHT: 71 IN | WEIGHT: 190 LBS | BODY MASS INDEX: 26.6 KG/M2

## 2024-07-08 DIAGNOSIS — R26.2 DIFFICULTY WALKING: ICD-10-CM

## 2024-07-08 DIAGNOSIS — I69.993 CVA, OLD, ATAXIA: ICD-10-CM

## 2024-07-08 DIAGNOSIS — I10 BENIGN ESSENTIAL HYPERTENSION: ICD-10-CM

## 2024-07-08 DIAGNOSIS — S62.102A LEFT WRIST FRACTURE, CLOSED, INITIAL ENCOUNTER: Primary | ICD-10-CM

## 2024-07-08 DIAGNOSIS — E78.2 MIXED HYPERLIPIDEMIA: ICD-10-CM

## 2024-07-08 DIAGNOSIS — R29.898 LEFT LEG WEAKNESS: Primary | ICD-10-CM

## 2024-07-08 DIAGNOSIS — S62.102A LEFT WRIST FRACTURE, CLOSED, INITIAL ENCOUNTER: ICD-10-CM

## 2024-07-08 DIAGNOSIS — G47.33 OSA (OBSTRUCTIVE SLEEP APNEA): ICD-10-CM

## 2024-07-08 DIAGNOSIS — Z99.89 CPAP (CONTINUOUS POSITIVE AIRWAY PRESSURE) DEPENDENCE: ICD-10-CM

## 2024-07-08 PROCEDURE — 99213 OFFICE O/P EST LOW 20 MIN: CPT | Performed by: ORTHOPAEDIC SURGERY

## 2024-07-08 PROCEDURE — 1159F MED LIST DOCD IN RCRD: CPT | Performed by: INTERNAL MEDICINE

## 2024-07-08 PROCEDURE — 73110 X-RAY EXAM OF WRIST: CPT | Mod: LEFT SIDE | Performed by: RADIOLOGY

## 2024-07-08 PROCEDURE — 1036F TOBACCO NON-USER: CPT | Performed by: ORTHOPAEDIC SURGERY

## 2024-07-08 PROCEDURE — 73110 X-RAY EXAM OF WRIST: CPT | Mod: LT

## 2024-07-08 PROCEDURE — 3077F SYST BP >= 140 MM HG: CPT | Performed by: INTERNAL MEDICINE

## 2024-07-08 PROCEDURE — 1160F RVW MEDS BY RX/DR IN RCRD: CPT | Performed by: INTERNAL MEDICINE

## 2024-07-08 PROCEDURE — 3080F DIAST BP >= 90 MM HG: CPT | Performed by: INTERNAL MEDICINE

## 2024-07-08 PROCEDURE — 1036F TOBACCO NON-USER: CPT | Performed by: INTERNAL MEDICINE

## 2024-07-08 PROCEDURE — 99214 OFFICE O/P EST MOD 30 MIN: CPT | Performed by: INTERNAL MEDICINE

## 2024-07-08 RX ORDER — HYDRALAZINE HYDROCHLORIDE 10 MG/1
TABLET, FILM COATED ORAL
COMMUNITY
Start: 2024-06-27

## 2024-07-08 RX ORDER — VALSARTAN 320 MG/1
TABLET ORAL
COMMUNITY
Start: 2024-05-14

## 2024-07-08 ASSESSMENT — ENCOUNTER SYMPTOMS
DEPRESSION: 0
OCCASIONAL FEELINGS OF UNSTEADINESS: 0
LOSS OF SENSATION IN FEET: 0

## 2024-07-08 ASSESSMENT — PAIN - FUNCTIONAL ASSESSMENT: PAIN_FUNCTIONAL_ASSESSMENT: NO/DENIES PAIN

## 2024-07-08 NOTE — PROGRESS NOTES
Subjective   Patient ID: Evangelist Infante is a 79 y.o. male who presents for Insomnia.    HPI   79 years old male comes in to see me today for follow-up on his obstructive sleep apnea test to review and to explain it to him.  All he knows that he is snores and he does not know if he has apnea during his sleep.  The test done by [facility] revealed evidence of moderate obstructive sleep apnea.  He admitted to feeling tired during the day and having sort of napping.  His hypertension is out-of-control.  He will benefit from his CPAP machine 6 cm of water every evening or night during his sleep.  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 difficile colitis this was he is treated for CAD by Dr. Leavitt cardiology his medications are Eliquis 5 mg twice a day.  Atenolol 50 mg a day in the evening and 100 mg in the morning.  Lipitor 40 mg a day.  Zetia 10 mg daily.  Tamsulosin 4 mg a day.  Valsartan and hydralazine 10 mg twice a day  He is also requesting physical therapy for weakness of the left leg, required that after his stroke years ago  Review of Systems  No Specific 12 system review  Objective   BP (!) 166/96 (BP Location: Right arm, Patient Position: Sitting, BP Cuff Size: Adult)   Pulse 75   Temp 36.8 °C (98.2 °F) (Temporal)   Wt 86.2 kg (190 lb)   SpO2 93%   BMI 26.50 kg/m²     Physical Exam  Alert oriented in no nonicteric sclera noted, no jaundice.  Face symmetrical.  Cranial nerves intact neck supple no masses no lymph no thyromegaly or jugular venous lungs clear no rales wheeze crackles, heart normal S1 and S2 regular rhythm.  Abdomen benign neurologically able to ambulate with a cane by himself.  Left-sided weakness  CPAP will be ordered and delivered by [facility] [place].  Like to have physical therapy on back side weakness specifically left  leg.  Assessment/Plan   Diagnoses and all orders for this visit:  Left leg weakness  -     Referral to Physical Therapy; Future  JONATAN (obstructive sleep apnea)  CPAP (continuous positive airway pressure) dependence  Benign essential hypertension  Mixed hyperlipidemia  Difficulty walking  CVA, old, ataxia

## 2024-07-08 NOTE — PROGRESS NOTES
[facility]  Hand and Upper Extremity Service  Follow up visit         Follow up for: Left wrist     Interval History: He reports he's experiencing very minimal pain. He wears his brace all day every day and only removes it for hygiene purposes.               Past medical history, medications, allergies, surgical history and review of systems are reviewed and otherwise unchanged when compared to last visit on 6/3/24         Examination:  Constitutional: Oriented to person, place, and time.  Appears well-developed and well-nourished.  Head: Normocephalic and atraumatic.  Eyes: Pupils are equal, round, and reactive to light.  Cardiovascular: Intact distal pulses.  Pulmonary/Chest/Breast: Effort normal. No respiratory distress.  Neurological: Alert and oriented to person, place, and time.  Skin: Skin is warm and dry.  Psychiatric: normal mood and affect.  Behavior is normal.  Musculoskeletal: Left wrist reveals minimal swelling. Full digital range of motion. Moderate limitations to wrist range of motion. Well preserved forearm range of motion. Minimal tenderness to fracture site.       Personal Interpretation of Diagnostic studies: Xrays of left wrist taken today demonstrate continued healing of extraarticular fracture of distal radius and what now appears to be a fracture through the ulnar neck. Fracture line is still visible but there's good callous formation.        Impression: Healing fracture left distal radius       Plan: At this point, I think the fracture is healed enough that I believe he can wean from the splint and return to use of left hand for normal use of daily activities as tolerated. He should be careful with heavy weight lifting, twisting, or weight bearing activities. He'll follow up in 6 weeks for repeat clinical examination with xrays of left wrist and if he's doing well at that time, he can follow up as needed after.        Follow up: 6 weeks              Tyree LINO  MD [place]  [facility]  Department of Orthopaedic Surgery  Hand and Upper Extremity Reconstruction    Scribe Attestation  By signing my name below, I, Teresa Malin   attest that this documentation has been prepared under the direction and in the presence of Dr. Tyree Maier.    Dictation performed with the use of voice recognition software.  Syntax and grammatical errors may exist.

## 2024-07-10 ENCOUNTER — TELEPHONE (OUTPATIENT)
Dept: PRIMARY CARE | Facility: CLINIC | Age: 79
End: 2024-07-10
Payer: MEDICARE

## 2024-07-10 NOTE — TELEPHONE ENCOUNTER
Mr. Infante called office today with update on his referral that he has been unable to schedule as the order reads functional capacity?  Please advise on order or change wording so he can get this scheduled.    Phone: [phone]

## 2024-07-11 DIAGNOSIS — R29.898 LEFT LEG WEAKNESS: Primary | ICD-10-CM

## 2024-07-17 ENCOUNTER — EVALUATION (OUTPATIENT)
Dept: PHYSICAL THERAPY | Facility: CLINIC | Age: 79
End: 2024-07-17
Payer: MEDICARE

## 2024-07-17 ENCOUNTER — TELEPHONE (OUTPATIENT)
Dept: PRIMARY CARE | Facility: CLINIC | Age: 79
End: 2024-07-17

## 2024-07-17 VITALS — HEART RATE: 87 BPM | SYSTOLIC BLOOD PRESSURE: 145 MMHG | DIASTOLIC BLOOD PRESSURE: 85 MMHG

## 2024-07-17 DIAGNOSIS — R29.898 LEFT LEG WEAKNESS: ICD-10-CM

## 2024-07-17 DIAGNOSIS — Z91.81 AT RISK FOR FALLS: ICD-10-CM

## 2024-07-17 DIAGNOSIS — R26.2 DIFFICULTY WALKING: Primary | ICD-10-CM

## 2024-07-17 PROCEDURE — 97161 PT EVAL LOW COMPLEX 20 MIN: CPT | Mod: GP | Performed by: PHYSICAL THERAPIST

## 2024-07-17 PROCEDURE — 97110 THERAPEUTIC EXERCISES: CPT | Mod: GP | Performed by: PHYSICAL THERAPIST

## 2024-07-17 ASSESSMENT — BALANCE ASSESSMENTS
STANDING UNSUPPORTED WITH EYES CLOSED: ABLE TO STAND 10 SECONDS SAFELY
PICK UP OBJECT FROM THE FLOOR FROM A STANDING POSITION: ABLE TO PICK UP SLIPPER SAFELY AND EASILY
TRANSFERS: ABLE TO TRANSFER SAFELY DEFINITE NEED OF HANDS
STANDING UNSUPPORTED WITH FEET TOGETHER: ABLE TO PLACE FEET TOGETHER INDEPENDENTLY AND STAND 1 MINUTE SAFELY
STANDING TO SITTING: CONTROLS DESCENT BY USING HANDS
TURN 360 DEGREES: ABLE TO TURN 360 DEGREES SAFELY BUT SLOWLY
STANDING UNSUPPORTED: ABLE TO STAND SAFELY FOR 2 MINUTES
PLACE ALTERNATE FOOT ON STEP OR STOOL WHILE STANDING UNSUPPORTED: LOOKS BEHIND ONE SIDE ONLY OTHER SIDE SHOWS LESS WEIGHT SHIFT
STANDING UNSUPPORTED ONE FOOT IN FRONT: ABLE TO PLACE FOOT AHEAD INDEPENDENTLY AND HOLD 30 SECONDS
SITTING WITH BACK UNSUPPORTED BUT FEET SUPPORTED ON FLOOR OR ON A STOOL: ABLE TO SIT SAFELY AND SECURELY FOR 2 MINUTES
STANDING ON ONE LEG: TRIES TO LIFT LEG UNABLE TO HOLD 3 SECONDS BUT REMAINS STANDING INDEPENDENTLY
LONG VERSION TOTAL SCORE (MAX 56): 42
STANDING TO SITTING: ABLE TO STAND INDEPENDENTLY USING HANDS
REACHING FORWARD WITH OUTSTRETCHED ARM WHILE STANDING: CAN REACH FORWARD 12 CM (5 INCHES)
PLACE ALTERNATE FOOT ON STEP OR STOOL WHILE STANDING UNSUPPORTED: ABLE TO COMPLETE GREATER THAN 2 STEPS NEEDS MINIMAL ASSIST

## 2024-07-17 ASSESSMENT — ENCOUNTER SYMPTOMS
LOSS OF SENSATION IN FEET: 0
DEPRESSION: 0
OCCASIONAL FEELINGS OF UNSTEADINESS: 0

## 2024-07-17 ASSESSMENT — PAIN SCALES - GENERAL: PAINLEVEL_OUTOF10: 0 - NO PAIN

## 2024-07-17 ASSESSMENT — PAIN - FUNCTIONAL ASSESSMENT: PAIN_FUNCTIONAL_ASSESSMENT: 0-10

## 2024-07-17 NOTE — PROGRESS NOTES
Physical Therapy    Physical Therapy Evaluation and Treatment      Patient Name: Evangelist Infante  MRN: [id number]  Today's Date: 7/21/2024  [facility]    Time Entry:   Time Calculation  Start Time: 1015  Stop Time: 1115  Time Calculation (min): 60 min  PT Evaluation Time Entry  PT Evaluation (Low) Time Entry: 35  PT Therapeutic Procedures Time Entry  Therapeutic Exercise Time Entry: 25    Assessment:  Patient is an 79 year old male referred to [facility]'s outpatient physical therapy services with a chief complaint of unsteadiness on his feet.  The patient has a history of chronic CVA with left hemiparesis. The patient also had a chronic diabetic foot ulcer that he has recovered from but was treated over the past year. In addition pt. Experienced a GLF in April of this year that led to a fractured left wrist.  This fall places pt. At a risk for future falls.  Mr. Infante scored in a fall risk category on the TRACY, 10MWT and 30 second sit to stand tests today.  The patient demonstrated reduced gait speed on 10MWT, requiring .54 meters/second to complete the test which is considered at risk for falls, hospitalization and adverse events based on normative value cut off score of .7meters/second for gait speed.  Mr. Infante will benefit from physical therapy services to address the above deficits, provide education and reduce fall risk in his home and the community.          Plan:  OP PT Plan  Treatment/Interventions: Cryotherapy, Dry needling, Education/ Instruction, Gait training, Electrical stimulation, Manual therapy, Neuromuscular re-education, Self care/ home management, Therapeutic activities, Therapeutic exercises, Taping techniques, Orthosis fit/ training  PT Plan: Skilled PT  PT Frequency: 2 times per week  Duration: 14 visits  Onset Date: 07/08/24  Certification Period Start Date: 07/17/24  Certification Period End Date: 10/15/24  Rehab Potential: Good  Plan of Care Agreement: Patient    Current Problem:   1.  Difficulty walking  Follow Up In Physical Therapy      2. Left leg weakness  Referral to Physical Therapy    Follow Up In Physical Therapy      3. At risk for falls  Follow Up In Physical Therapy          Subjective    Pt. Reports that he had as stroke 19 years that caused weakness on his left side. He also had an aneurysm.  He developed a sore on his right foot and that took 9 months to heal, this was last year.  They put him in the hospital and they operated him and then he contracted c-diff and was confined to his home for 4 months. He hasn't gotten much exercises as a result.  He then had to go on medication to get rid of the c-diff.  Recently (about a month ago) he broke his wrist and he had a push cart and he didn't want his wife to help, he was pushing 40 pounds out in a cart to the road and the card collapsed and he went to jump after the clothes and he fell into a ditch near the mailbox.  He isnt wearing the brace on his wrist because he was coming here, he only has to wear it when he needs it.  He goes back into the orthopedic doctor August 19th.  His cardiologist just increased some of his BP medications.     CLOF: has a SPC and has been using this for the past 8-9 months (uses it at night but not during the day in his home and uses it in the communitybecause of the ulcer on his right leg, goes to get the mail and does light yard work, he does grocery shopping/cleaning as best as he can    Home Setup: lives with his wife, his kids are in town, 2 JOSE with railing and in main living space there is a bathroom and bedroom on first floor, has basement and 2nd dloor and using a railing (sometimes goes backwards.  His shower is a walk-In with grab bars in shower and a build in seat and has night lights  Equipment: SPC  Hobbies: not doing too much, used to golf up to 2 years ago and now he feels off with his balance.  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 Goal: \"improve balance/strength\"       Precautions: a-fib, /htn, PVD, old CVA with L hemiparesis, DMII     Vital Signs: 145/85 LUE electronic cuff, HR=87 BPM     Pain: 0/10          Objective   Cognition: no impairments noted     observation=pt. Arrived to visit with SPC  Tremor=neg.  Coordination= finger opposition and pronation/supination and DF/PF WNL on R, finger opposition/pronation/supination and DF/PF all moderately impaired on L side  Vision= wears glasses for reading, had a cataract removed on the right side  Hearing=WNL  Sensation= N/T in his left hand  Edema=noted in LLE's, wears compression stockings during the day  Gait=decreased LLE push-off,  L foot slap, decreased L knee flexion with no device, decrease stance time on LLE with no device    Lower Extremity Strength MMT (tested in seated)    Left Right   Hip flexion 5/5 4-/5   Hip ADD  4+/5 4+/5   Hip ABD 4+/5 4+/5   Knee flexion 4/5 5/5   Knee Extension 5/5 4/5   Ankle Dorsiflexion 3+/5 4/5   Ankle Plantarflexion 4-/5 5/5   Ankle Inversion 4-/5 5/5   Ankle Eversion 4-/5 5/5     Outcome Measures:  TRACY=43/56  10MWT=18. 5 seconds normal with no device with no device (.54 meters/second)  -15.8 seconds with no devices at \"fast\" pace with SBA   30 second sit<>stand test= 8 reps with unilateral UE support   LEFS=58    *due to time constraints further testing including 6MWT will be performed in next 1-2 visits    Treatments:    There Ex:  Educated pt. On completing sit to stands daily for HEP, provided printed handout  Educated pt. On importance of regular cardiovascular exercise including recumbent bike   Educated pt. On daily walking inside to improve cardiovascular endurance  Educated pt. On my recommendation to utilize SPC in the community to reduce " "fall risk    EDUCATION:  -see flow sheet and treatment       Goals:  Active       PT Problem       PT Goal 1       Start:  07/21/24    Expected End:  10/15/24       Pt. Will improve 10MWT by .16 m/s to meet normative value for MCID for individuals post CVA.         PT Goal 2       Start:  07/21/24    Expected End:  10/15/24       Pt. Will improve 30 second sit to stand test by 2 repetitions to meet MCID for significant change compared to normative values and indicate improved functional strength.            PT Goal 3       Start:  07/21/24    Expected End:  10/15/24       Pt. Will improve TRACY by 2.8 points in order to meet MDC for individuals post chronic CVA compared to normative values.          PT Goal 4       Start:  07/21/24    Expected End:  10/15/24       Pt. Will complete 6MWT in next 1-2 visits.         PT Goal 5       Start:  07/21/24    Expected End:  10/15/24       Pt. Will demonstrate positive compensatory stepping strategies in all directions in order to reduce fall risk of pt. in his home and the community.         Patient Stated Goal 1       Start:  07/21/24    Expected End:  10/15/24       \"Improve balance/strength\"         LEFS       Start:  07/21/24       Pt. Will improve LEFS by 9 points to meet MCID for significant change compared to normative values.                   "

## 2024-07-17 NOTE — TELEPHONE ENCOUNTER
Loraine with [facility] called office and stated that he hasn't been able to reach patient to schedule/set up regaring sleep apnea.  Loraine wanted to know if the office can call patient and have patient call [facility].    [phone]    [phone]

## 2024-07-18 NOTE — TELEPHONE ENCOUNTER
This MA called and spoke with patient, he was given the message to call Matt at [facility] numbers  [phone]     [phone]

## 2024-07-26 ENCOUNTER — TREATMENT (OUTPATIENT)
Dept: PHYSICAL THERAPY | Facility: CLINIC | Age: 79
End: 2024-07-26
Payer: MEDICARE

## 2024-07-26 DIAGNOSIS — R29.898 LEFT LEG WEAKNESS: ICD-10-CM

## 2024-07-26 DIAGNOSIS — R26.2 DIFFICULTY WALKING: ICD-10-CM

## 2024-07-26 DIAGNOSIS — Z91.81 AT RISK FOR FALLS: ICD-10-CM

## 2024-07-26 PROCEDURE — 97110 THERAPEUTIC EXERCISES: CPT | Mod: GP | Performed by: PHYSICAL THERAPIST

## 2024-07-26 PROCEDURE — 97530 THERAPEUTIC ACTIVITIES: CPT | Mod: GP | Performed by: PHYSICAL THERAPIST

## 2024-07-26 ASSESSMENT — PAIN SCALES - GENERAL: PAINLEVEL_OUTOF10: 0 - NO PAIN

## 2024-07-26 ASSESSMENT — PAIN - FUNCTIONAL ASSESSMENT: PAIN_FUNCTIONAL_ASSESSMENT: 0-10

## 2024-07-26 NOTE — PROGRESS NOTES
"Physical Therapy    Physical Therapy Treatment    Patient Name: Evangelist Infante  MRN: [id number]  Today's Date: 7/26/2024  Visit number: 2  [facility]  Time Entry:   Time Calculation  Start Time: 1348  Stop Time: 1430  Time Calculation (min): 42 min     PT Therapeutic Procedures Time Entry  Therapeutic Exercise Time Entry: 13  Therapeutic Activity Time Entry: 29       Assessment:  Time was spent reviewing gait with AFO on RLE due to chronic foot drop on RLE and pt. With multiple instances of R foot catching during functional mobility during the 6MWT. Educated pt. On benefits of an AFO to reduce fall risk and increase efficiency of gait, reduce energy expenditure.  Pt. Was unable to complete full 6MWT and stopped at 3'20\" due to fatigue.  Pt. Speed during 6MWT was .65 meters/second which places pt. At an increased risk for falls, hospitalization and adverse events compared to cut off score of .7 meters/second.       Plan: gait trials with custom AFO versus carbon fiber AFO       Current Problem  1. Left leg weakness  Follow Up In Physical Therapy      2. Difficulty walking  Follow Up In Physical Therapy      3. At risk for falls  Follow Up In Physical Therapy                  Subjective    Pt. Reports that he is walking in the driveway 3x/day sometimes 4 times a day.  He also does the sit to stands.  He can remove the splint until August 19th.  He has no lifting restrictions in his left hand. He notices his left foot catch more when he walks.    Precautions:   Precautions  Precautions Comment: a-fib, /htn, PVD, old CVA with L hemiparesis, DMII  Vital Signs     Pain  Pain Assessment  Pain Assessment: 0-10  0-10 (Numeric) Pain Score: 0 - No pain    Objective   Cognition           Outcome Measures:  Other Measures  6 min Walk Test: 414 ft. with SPC with SBA, multiple instances of L foot catching but no loss of balance. Pt. stopped at 3'20\"(speed .65 meters/second)    Treatments:    There Act:  -6MWT with SPC device 414 ft. with " "SPC with SBA, multiple instances of L foot catching but no loss of balance. Pt. stopped at 3'20\"   -6MWT with  ft. with SPC with SBA, with carbon fiber AFO on LLE with no instances of LLE catching. Pt. stopped at 3'27\"   -Functional mobility 40'x1 with SPC with SBA, x1 instance of R foot catching   -educated pt. On benefits of utilziing AFO to decrease fall risk, discussed brining old AFO to next visit.  -educated pt. On adaptive shoe wear to increase independence with donning/doffing shoes    There Ex:  -Lateral walkout with RTB on ankles with unilateral UE support 10'x3  -resisted walks with unilateral UE support 10'x3   -provided printed handout for lateral band walk and resisted F/B walking    OP EDUCATION:  -daily walks  -sit<>Stands  -resisted walking F/laterally       Goals:  Active       PT Problem       PT Goal 1       Start:  07/21/24    Expected End:  10/15/24       Pt. Will improve 10MWT by .16 m/s to meet normative value for MCID for individuals post CVA.         PT Goal 2       Start:  07/21/24    Expected End:  10/15/24       Pt. Will improve 30 second sit to stand test by 2 repetitions to meet MCID for significant change compared to normative values and indicate improved functional strength.            PT Goal 3       Start:  07/21/24    Expected End:  10/15/24       Pt. Will improve TRACY by 2.8 points in order to meet MDC for individuals post chronic CVA compared to normative values.          PT Goal 4       Start:  07/21/24    Expected End:  10/15/24       Pt. Will complete 6MWT in next 1-2 visits.         PT Goal 5       Start:  07/21/24    Expected End:  10/15/24       Pt. Will demonstrate positive compensatory stepping strategies in all directions in order to reduce fall risk of pt. in his home and the community.         Patient Stated Goal 1       Start:  07/21/24    Expected End:  10/15/24       \"Improve balance/strength\"         LEFS       Start:  07/21/24    Expected End:  10/15/24    "    Pt. Will improve LEFS by 9 points to meet MCID for significant change compared to normative values.

## 2024-07-30 ENCOUNTER — TREATMENT (OUTPATIENT)
Dept: PHYSICAL THERAPY | Facility: CLINIC | Age: 79
End: 2024-07-30
Payer: MEDICARE

## 2024-07-30 DIAGNOSIS — R29.898 LEFT LEG WEAKNESS: ICD-10-CM

## 2024-07-30 DIAGNOSIS — R26.2 DIFFICULTY WALKING: Primary | ICD-10-CM

## 2024-07-30 DIAGNOSIS — Z91.81 AT RISK FOR FALLS: ICD-10-CM

## 2024-07-30 PROCEDURE — 97110 THERAPEUTIC EXERCISES: CPT | Mod: GP | Performed by: PHYSICAL THERAPIST

## 2024-07-30 PROCEDURE — 97112 NEUROMUSCULAR REEDUCATION: CPT | Mod: GP | Performed by: PHYSICAL THERAPIST

## 2024-07-30 ASSESSMENT — PAIN SCALES - GENERAL: PAINLEVEL_OUTOF10: 0 - NO PAIN

## 2024-07-30 ASSESSMENT — PAIN - FUNCTIONAL ASSESSMENT: PAIN_FUNCTIONAL_ASSESSMENT: 0-10

## 2024-07-30 NOTE — PROGRESS NOTES
Physical Therapy    Physical Therapy Treatment    Patient Name: Evangelist Infante  MRN: [id number]  Today's Date: 7/30/2024  Visit number: 3  [facility]  Primary dx. =Difficulty walking  Time Entry:   Time Calculation  Start Time: 1135  Stop Time: 1215  Time Calculation (min): 40 min     PT Therapeutic Procedures Time Entry  Neuromuscular Re-Education Time Entry: 15  Therapeutic Exercise Time Entry: 25       Assessment:  Pt. Brought his old AFO, however it was an off-the shelf model.  Patient will benefit from a more firm, carbon fiber AFO in order to obtain proper dorsiflexion during swing phase and initial heel strike and prevent falls.  Pt. Performed full visit with carbon fiber AFO on RLE with no instances of loss of balance or catching R toe in swing phase of gait.  The patient was challenged with obstacle course with fear of falling noted and difficulty with bilateral lateral weight shifting requiring minimal assist at times, however the patient improved with practice.       Plan: gait trials with custom AFO versus carbon fiber AFO       Current Problem  1. Difficulty walking  Follow Up In Physical Therapy      2. Left leg weakness  Follow Up In Physical Therapy      3. At risk for falls  Follow Up In Physical Therapy                Subjective    Pt. Reports that he did a lot of repetitions of his exercises and he wasn't sure how many he should do and ended up .  On Saturday night that same day, his wife thought that he was slurring his words and she thought that he should go to the ED but he didn't want to. He brought his old AFO today    Precautions:   Precautions  Precautions Comment: a-fib, /htn, PVD, old CVA with L hemiparesis, DMII  Vital Signs     Pain  Pain Assessment  Pain Assessment: 0-10  0-10 (Numeric) Pain Score: 0 - No pain    Objective            Outcome Measures:   (speed .65 meters/second)    Treatments:    There Ex:  -recumbent stepper with RUE and BLE's at L1 for 8'x1 with VC to keep RPM's above 70 to  improve LE strength, cardiovascular endurance  -Step up/down x10 on each side with no UE support with CGA  -step up and tap x10 with unilateral->2 digit tap support on the RLE with CGA    Neuro Re-ED:  -functional mobility 50'x2 with no device with L carbon fiber AFO  -functional mobility over obstacle course with x4 hurdles, x1 4-inch step and x1 airex pad F direction with CGA->min-A 30'x8 laps       OP EDUCATION:  -daily walks  -sit<>Stands  -resisted walking F/laterally       Goals:

## 2024-08-01 ENCOUNTER — APPOINTMENT (OUTPATIENT)
Dept: PHYSICAL THERAPY | Facility: CLINIC | Age: 79
End: 2024-08-01
Payer: MEDICARE

## 2024-08-06 ENCOUNTER — TREATMENT (OUTPATIENT)
Dept: PHYSICAL THERAPY | Facility: CLINIC | Age: 79
End: 2024-08-06
Payer: MEDICARE

## 2024-08-06 DIAGNOSIS — R26.2 DIFFICULTY WALKING: ICD-10-CM

## 2024-08-06 DIAGNOSIS — Z91.81 AT RISK FOR FALLS: ICD-10-CM

## 2024-08-06 DIAGNOSIS — R29.898 LEFT LEG WEAKNESS: ICD-10-CM

## 2024-08-06 PROCEDURE — 97110 THERAPEUTIC EXERCISES: CPT | Mod: GP | Performed by: PHYSICAL THERAPIST

## 2024-08-06 PROCEDURE — 97112 NEUROMUSCULAR REEDUCATION: CPT | Mod: GP | Performed by: PHYSICAL THERAPIST

## 2024-08-06 ASSESSMENT — PAIN - FUNCTIONAL ASSESSMENT: PAIN_FUNCTIONAL_ASSESSMENT: 0-10

## 2024-08-06 ASSESSMENT — PAIN SCALES - GENERAL: PAINLEVEL_OUTOF10: 0 - NO PAIN

## 2024-08-06 NOTE — PROGRESS NOTES
Physical Therapy    Physical Therapy Treatment    Patient Name: Evangelist Infante  MRN: [id number]  Today's Date: 8/6/2024  Visit number: 4  [facility]  Primary dx. =Difficulty walking  Time Entry:   Time Calculation  Start Time: 1130  Stop Time: 1215  Time Calculation (min): 45 min     PT Therapeutic Procedures Time Entry  Neuromuscular Re-Education Time Entry: 37  Therapeutic Exercise Time Entry: 13       Assessment:  Mr. Infante demonstrated significantly improved left sided weight shift and confidence in his balance during today's session. The patient progressed to completing obstacle course and stair exercises with CGA and did not demonstrate any significant losses of balance.  Pt. Benefits from utilization of carbon fiber AFO to improve L heel strike during swing phase of gait.         Plan: balance on uneven surfaces, resisted walking       Current Problem  1. Left leg weakness  Follow Up In Physical Therapy      2. Difficulty walking  Follow Up In Physical Therapy      3. At risk for falls  Follow Up In Physical Therapy                Subjective    Pt. Reports that he hasn't used his CPAP machine for 3 days because it isnt working well.  No falls.     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 Arrived to visit with SPC at mod-I level    Treatments:    There Ex:  -recumbent stepper with RUE and BLE's at L2.5 for 10'x1 with VC to keep RPM's above 70 to improve LE strength, cardiovascular endurance  -ascend/descend stairs with unilateral railing x3 bouts 3-6 inch stairs    Neuro Re-ED:  -step up and tap x10 WB on LLE with 2 digit upper extremity support progressing to no upper extremity support    The following exercises were performed with carbon fiber AFO on LLE:  -functional mobility over obstacle course with x4 hurdles, x1 4-inch step  and x1 airex pad F direction with CGA 30'x4 laps, timed:   1st bout= 31 sec.   2nd bout=23 sec.   3rd bout= 22 sec.   4th bout=18 sec.   -obstacle course laterally with x3 hurdles, x1 airex and 4-inch, x1 lap in each direction 20'x1 with CGA  -resisted functional mobiltiy with BTB around pelvis 110'x1    OP EDUCATION:  -daily walks  -sit<>Stands  -resisted walking F/laterally       Goals:

## 2024-08-08 ENCOUNTER — OFFICE VISIT (OUTPATIENT)
Dept: PRIMARY CARE | Facility: CLINIC | Age: 79
End: 2024-08-08
Payer: MEDICARE

## 2024-08-08 VITALS
SYSTOLIC BLOOD PRESSURE: 128 MMHG | BODY MASS INDEX: 25.8 KG/M2 | DIASTOLIC BLOOD PRESSURE: 70 MMHG | WEIGHT: 185 LBS | TEMPERATURE: 97.8 F

## 2024-08-08 DIAGNOSIS — R97.20 ABNORMAL PSA: Primary | ICD-10-CM

## 2024-08-08 DIAGNOSIS — I48.19 PERSISTENT ATRIAL FIBRILLATION (MULTI): ICD-10-CM

## 2024-08-08 DIAGNOSIS — I69.354 HEMIPARESIS OF LEFT NONDOMINANT SIDE AS LATE EFFECT OF CEREBRAL INFARCTION (MULTI): ICD-10-CM

## 2024-08-08 DIAGNOSIS — R53.83 FATIGUE, UNSPECIFIED TYPE: ICD-10-CM

## 2024-08-08 DIAGNOSIS — M19.079 ARTHROPATHY OF ANKLE AND FOOT: ICD-10-CM

## 2024-08-08 PROBLEM — L89.92: Status: RESOLVED | Noted: 2021-06-16 | Resolved: 2024-08-08

## 2024-08-08 PROCEDURE — 84153 ASSAY OF PSA TOTAL: CPT | Performed by: INTERNAL MEDICINE

## 2024-08-08 PROCEDURE — 1160F RVW MEDS BY RX/DR IN RCRD: CPT | Performed by: INTERNAL MEDICINE

## 2024-08-08 PROCEDURE — 84443 ASSAY THYROID STIM HORMONE: CPT | Performed by: INTERNAL MEDICINE

## 2024-08-08 PROCEDURE — 1159F MED LIST DOCD IN RCRD: CPT | Performed by: INTERNAL MEDICINE

## 2024-08-08 PROCEDURE — 99214 OFFICE O/P EST MOD 30 MIN: CPT | Performed by: INTERNAL MEDICINE

## 2024-08-08 PROCEDURE — 3078F DIAST BP <80 MM HG: CPT | Performed by: INTERNAL MEDICINE

## 2024-08-08 PROCEDURE — 1036F TOBACCO NON-USER: CPT | Performed by: INTERNAL MEDICINE

## 2024-08-08 PROCEDURE — 1126F AMNT PAIN NOTED NONE PRSNT: CPT | Performed by: INTERNAL MEDICINE

## 2024-08-08 PROCEDURE — 3074F SYST BP LT 130 MM HG: CPT | Performed by: INTERNAL MEDICINE

## 2024-08-08 ASSESSMENT — ENCOUNTER SYMPTOMS
LOSS OF SENSATION IN FEET: 0
DEPRESSION: 0
OCCASIONAL FEELINGS OF UNSTEADINESS: 1

## 2024-08-08 ASSESSMENT — PAIN SCALES - GENERAL: PAINLEVEL: 0-NO PAIN

## 2024-08-08 NOTE — PROGRESS NOTES
Subjective   Patient ID: Evangelist Infante is a 79 y.o. male who presents for Hypertension, Hypothyroidism, and Hyperlipidemia.    HPI   79 years old male comes in to see me today because he needs an orthosis for his left foot.  [phone]  His right foot also healed and now he is walking with a cane to protect his left foot since he is having pain and weakness on the left foot and ankle.  This is caused by the old stroke 30 years ago with left hemiparesis and difficulty ambulating which is getting worse with his aging.  He needs an insert orthosis to the left foot to help him ambulate, ambulate without the cane, and give him some strength and adjustment for the ankle and foot weakness.  Attends a medical necessity.  Review of Systems  12 system review 12 system are negative  Objective   /70 (BP Location: Right arm, Patient Position: Sitting, BP Cuff Size: Adult)   Temp 36.6 °C (97.8 °F) (Temporal)   Wt 83.9 kg (185 lb)   BMI 25.80 kg/m²     Physical Exam  Alert oriented no distress nonicteric sclera no jaundice.  Face symmetrical.  Nerves intact.  Neck supple no mass no lymph nodes no thyromegaly no jugular venous distention.  Lungs clear no rales wheezing or crackles but diminished.  Heart irregular rhythm, normal S1 and S2.  Abdomen benign neurologically left-sided weakness and aphasia difficulty with ambulation without the cane for support high risk of falls.  Assessment/Plan   Diagnoses and all orders for this visit:  Abnormal PSA  -     Prostate Specific Antigen  Arthropathy of ankle and foot  Fatigue, unspecified type  -     Thyroid Stimulating Hormone  Hemiparesis of left nondominant side as late effect of cerebral infarction (Multi)  Persistent atrial fibrillation (Multi)

## 2024-08-09 ENCOUNTER — TREATMENT (OUTPATIENT)
Dept: PHYSICAL THERAPY | Facility: CLINIC | Age: 79
End: 2024-08-09
Payer: MEDICARE

## 2024-08-09 DIAGNOSIS — Z91.81 AT RISK FOR FALLS: ICD-10-CM

## 2024-08-09 DIAGNOSIS — R29.898 LEFT LEG WEAKNESS: ICD-10-CM

## 2024-08-09 DIAGNOSIS — R26.2 DIFFICULTY WALKING: Primary | ICD-10-CM

## 2024-08-09 PROCEDURE — 97110 THERAPEUTIC EXERCISES: CPT | Mod: GP | Performed by: PHYSICAL THERAPIST

## 2024-08-09 PROCEDURE — 97112 NEUROMUSCULAR REEDUCATION: CPT | Mod: GP | Performed by: PHYSICAL THERAPIST

## 2024-08-09 ASSESSMENT — PAIN SCALES - GENERAL: PAINLEVEL_OUTOF10: 0 - NO PAIN

## 2024-08-09 ASSESSMENT — PAIN - FUNCTIONAL ASSESSMENT: PAIN_FUNCTIONAL_ASSESSMENT: 0-10

## 2024-08-09 NOTE — PROGRESS NOTES
Physical Therapy    Physical Therapy Treatment    Patient Name: Evangelist Infante  MRN: [id number]  Today's Date: 8/9/2024  Visit number: 4  [facility]  Primary dx. =Difficulty walking  Time Entry:   Time Calculation  Start Time: 1105  Stop Time: 1145  Time Calculation (min): 40 min     PT Therapeutic Procedures Time Entry  Neuromuscular Re-Education Time Entry: 30  Therapeutic Exercise Time Entry: 10       Assessment:  Mr. Infante tends to demonstrate retropulsion during sit to stands with impaired righting reactions requiring CGA->minimal assist.  The patient is demonstrating improved gait speed overall and increased stance time on LLE during functional mobility.  Pt. Will benefit from further balance training with error augmentation to correct for posterior displaced COG.       Plan: balance on uneven surfaces, resisted walking       Current Problem  1. Difficulty walking  Follow Up In Physical Therapy      2. Left leg weakness  Follow Up In Physical Therapy      3. At risk for falls  Follow Up In Physical Therapy                Subjective    Pt. Reports that not much is new, he is doing his exercises      Precautions:   Precautions  Precautions Comment: a-fib, /htn, PVD, old CVA with L hemiparesis, DMII  Vital Signs     Pain  Pain Assessment  Pain Assessment: 0-10  0-10 (Numeric) Pain Score: 0 - No pain    Objective            Pt. Arrived to visit with SPC at mod-I level    Treatments:    There Ex:  -recumbent stepper with RUE and BLE's at L2.5 for 10'x1 with VC to keep RPM's above 70 to improve LE strength, cardiovascular endurance    Neuro Re-ED:  -sit<>stands with RLE on foam with GTB pulling COG to the R side x10 with multiple LOB posteriorly into mat table requiring CGA->min-A  -squats with YTB on thighs 1x10 with RLE on foam   -step up 8 inch step and hold with RUE support on pink band hanging from pulley system x10 with CGA->min-A with retropulsion noted    -lateral side stepping with blaze pods on 4 inch step and 6  "inch 1'30\"x2 with CGA, pods spaced out 6'   1st bout=4 reps   2nd bout=8 reps  -resisted functional mobiltiy with GTB around pelvis 110'x1    OP EDUCATION:  -daily walks  -sit<>Stands  -resisted walking F/laterally       Goals:      "

## 2024-08-12 ENCOUNTER — TELEPHONE (OUTPATIENT)
Dept: PRIMARY CARE | Facility: CLINIC | Age: 79
End: 2024-08-12
Payer: MEDICARE

## 2024-08-12 NOTE — TELEPHONE ENCOUNTER
----- Message from Cooper Kong sent at 8/12/2024  8:24 AM EDT -----  Regarding: r  Thyroid test normal good thyroid function.  PSA 4.06 slightly tiny bit elevated that should be 4 or less.  Last PSA in December last year was 4.37.  2 years ago below for.  So that could be BPH or enlarged prostate.  Any questions call us at the office or you may consider seeing consulting urology

## 2024-08-13 ENCOUNTER — TREATMENT (OUTPATIENT)
Dept: PHYSICAL THERAPY | Facility: CLINIC | Age: 79
End: 2024-08-13
Payer: MEDICARE

## 2024-08-13 DIAGNOSIS — Z91.81 AT RISK FOR FALLS: ICD-10-CM

## 2024-08-13 DIAGNOSIS — R29.898 LEFT LEG WEAKNESS: ICD-10-CM

## 2024-08-13 DIAGNOSIS — R26.2 DIFFICULTY WALKING: Primary | ICD-10-CM

## 2024-08-13 PROCEDURE — 97110 THERAPEUTIC EXERCISES: CPT | Mod: GP | Performed by: PHYSICAL THERAPIST

## 2024-08-13 PROCEDURE — 97112 NEUROMUSCULAR REEDUCATION: CPT | Mod: GP | Performed by: PHYSICAL THERAPIST

## 2024-08-13 ASSESSMENT — PAIN SCALES - GENERAL: PAINLEVEL_OUTOF10: 0 - NO PAIN

## 2024-08-13 ASSESSMENT — PAIN - FUNCTIONAL ASSESSMENT: PAIN_FUNCTIONAL_ASSESSMENT: 0-10

## 2024-08-13 NOTE — PROGRESS NOTES
Physical Therapy    Physical Therapy Treatment    Patient Name: Evangelist Infante  MRN: [id number]  Today's Date: 8/13/2024  Visit number: 6  [facility]  Primary dx. =Difficulty walking  Time Entry:   Time Calculation  Start Time: 1116  Stop Time: 1200  Time Calculation (min): 44 min     PT Therapeutic Procedures Time Entry  Neuromuscular Re-Education Time Entry: 34  Therapeutic Exercise Time Entry: 10       Assessment:  Mr. Infante returned today with report of blood blister on the right arch of his foot.  I took a look and there is a callus noted with blood noted underneath.  We discussed plan to cancel this Thursday's visit and to follow up with his podiatrist.  The plan is to await results of visit with his podiatrist to determine if I am going to place the patient on a hold from physical therapy at this time to allow pressure ulcer to heal.  Overall the patient has been making great progress in physical therapy to date, pt. Is demonstrated improved gait quality and balance on his left side which has been the focus on interventions to date.        Plan: balance on uneven surfaces, resisted walking       Current Problem  1. Difficulty walking  Follow Up In Physical Therapy      2. Left leg weakness  Follow Up In Physical Therapy      3. At risk for falls  Follow Up In Physical Therapy                Subjective    Pt. Reports that he is calling his foot doctor because he is getting a callus on his right foot where his wound used to bed.  He checks his foot every day    Precautions:   Precautions  Precautions Comment: a-fib, /htn, PVD, old CVA with L hemiparesis, DMII  Vital Signs     Pain  Pain Assessment  Pain Assessment: 0-10  0-10 (Numeric) Pain Score: 0 - No pain    Objective            Pt. Arrived to visit with SPC at mod-I level    Treatments:    There Ex:  -recumbent stepper with RUE and BLE's at L2.5 for 10'x1 with VC to keep RPM's above 70 to improve LE strength, cardiovascular endurance  -educated pt. On utilizing  recumbent bike at home for exercise and cutting back walking to allow pressure ulcer to heel.    Neuro Re-ED:  -sit<>stands with RLE on x2 foams x10 reps with Rue reach anterior and to the L side with CGA->min-A  -resisted step-ups 6 inch with GTB around waist with emphasis on LLE WB 1x15 reps   -lateral step-ups 6 inch step with unilateral->no UE support with CGA->min-A 1x10 reps   -functional mobility through agility ladder x6 laps reciprocal pattern with CGA    OP EDUCATION:  -daily walks  -sit<>Stands  -resisted walking F/laterally       Goals:

## 2024-08-15 ENCOUNTER — APPOINTMENT (OUTPATIENT)
Dept: PHYSICAL THERAPY | Facility: CLINIC | Age: 79
End: 2024-08-15
Payer: MEDICARE

## 2024-08-16 ENCOUNTER — TELEPHONE (OUTPATIENT)
Dept: PRIMARY CARE | Facility: CLINIC | Age: 79
End: 2024-08-16
Payer: MEDICARE

## 2024-08-16 DIAGNOSIS — S62.102A LEFT WRIST FRACTURE, CLOSED, INITIAL ENCOUNTER: Primary | ICD-10-CM

## 2024-08-16 DIAGNOSIS — M21.962 DEFORMITY OF LEFT FOOT: Primary | ICD-10-CM

## 2024-08-16 NOTE — TELEPHONE ENCOUNTER
[facility] called stating they received appointment notes for pt but were unsure as to why because they are not seeing the pt and do not have a referral or any other information on file in relation to the patient. They requested a call for clarification to [phone].

## 2024-08-18 NOTE — PROGRESS NOTES
[facility]  Hand and Upper Extremity Service  Follow up visit         Follow up for: Left wrist     Interval History: He returns for follow up of his left wrist. He had a fracture earlier this summer that was treated with in-office closed reduction and then treated nonoperatively. Overall he's doing well and hasn't had any pain or worn his brace within the last couple of weeks. He reports baseline motor and sensory deficits from a prior stroke.               Past medical history, medications, allergies, surgical history and review of systems are reviewed and otherwise unchanged when compared to last visit on 7/8/24         Examination:  Constitutional: Oriented to person, place, and time.  Appears well-developed and well-nourished.  Head: Normocephalic and atraumatic.  Eyes: Pupils are equal, round, and reactive to light.  Cardiovascular: Intact distal pulses.  Pulmonary/Chest/Breast: Effort normal. No respiratory distress.  Neurological: Alert and oriented to person, place, and time.  Skin: Skin is warm and dry.  Psychiatric: normal mood and affect.  Behavior is normal.  Musculoskeletal: Left hand reveals absence of any significant swelling, skin changes, or deformities. Functional wrist, forearm, and hand range of motion with functional  strength, somewhat asymmetric given his prior stroke.       Personal Interpretation of Diagnostic studies: Xrays of left wrist taken today demonstrate abundant healing response of extraarticular fracture of left distal radius with healing and near anatomical alignment.        Impression: Healed fracture left distal radius       Plan: I have no restrictions for him and he can wear the splint as needed and can continue with activities as tolerated. I am happy to see him in the future should he have any questions or concerns.       Follow up: As needed             Tyree Maier MD  [facility]  Department of  Orthopaedic Surgery  Hand and Upper Extremity Reconstruction    Scribe Attestation  By signing my name below, I, Teresa Malin   attest that this documentation has been prepared under the direction and in the presence of Dr. Tyree Maier.    Dictation performed with the use of voice recognition software.  Syntax and grammatical errors may exist.

## 2024-08-19 ENCOUNTER — APPOINTMENT (OUTPATIENT)
Dept: ORTHOPEDIC SURGERY | Facility: CLINIC | Age: 79
End: 2024-08-19
Payer: MEDICARE

## 2024-08-19 ENCOUNTER — HOSPITAL ENCOUNTER (OUTPATIENT)
Dept: RADIOLOGY | Facility: CLINIC | Age: 79
Discharge: HOME | End: 2024-08-19
Payer: MEDICARE

## 2024-08-19 VITALS — HEIGHT: 71 IN | WEIGHT: 185 LBS | BODY MASS INDEX: 25.9 KG/M2

## 2024-08-19 DIAGNOSIS — S62.102A LEFT WRIST FRACTURE, CLOSED, INITIAL ENCOUNTER: Primary | ICD-10-CM

## 2024-08-19 DIAGNOSIS — S62.102A LEFT WRIST FRACTURE, CLOSED, INITIAL ENCOUNTER: ICD-10-CM

## 2024-08-19 PROCEDURE — 73110 X-RAY EXAM OF WRIST: CPT | Mod: LT

## 2024-08-19 PROCEDURE — 1036F TOBACCO NON-USER: CPT | Performed by: ORTHOPAEDIC SURGERY

## 2024-08-19 PROCEDURE — 73110 X-RAY EXAM OF WRIST: CPT | Mod: LEFT SIDE | Performed by: RADIOLOGY

## 2024-08-19 PROCEDURE — 99213 OFFICE O/P EST LOW 20 MIN: CPT | Performed by: ORTHOPAEDIC SURGERY

## 2024-08-19 PROCEDURE — 1159F MED LIST DOCD IN RCRD: CPT | Performed by: ORTHOPAEDIC SURGERY

## 2024-08-19 ASSESSMENT — PAIN - FUNCTIONAL ASSESSMENT: PAIN_FUNCTIONAL_ASSESSMENT: 0-10

## 2024-08-19 ASSESSMENT — PAIN SCALES - GENERAL: PAINLEVEL_OUTOF10: 5 - MODERATE PAIN

## 2024-08-19 ASSESSMENT — PAIN DESCRIPTION - DESCRIPTORS: DESCRIPTORS: ACHING;SORE

## 2024-08-19 NOTE — LETTER
August 19, 2024     Cooper Kong MD  [address] [facility] [address]    Patient: Evangelist Infante   YOB: 1945   Date of Visit: 8/19/2024       Dear Dr. Cooper Kong MD:    Thank you for referring Evangelist Infante to me for evaluation. Below are my notes for this consultation.  If you have questions, please do not hesitate to call me. I look forward to following your patient along with you.       Sincerely,     Tyree Maier MD      CC: No Recipients  ______________________________________________________________________________________    [facility]  Hand and Upper Extremity Service  Follow up visit         Follow up for: Left wrist     Interval History: He returns for follow up of his left wrist. He had a fracture earlier this summer that was treated with in-office closed reduction and then treated nonoperatively. Overall he's doing well and hasn't had any pain or worn his brace within the last couple of weeks. He reports baseline motor and sensory deficits from a prior stroke.               Past medical history, medications, allergies, surgical history and review of systems are reviewed and otherwise unchanged when compared to last visit on 7/8/24         Examination:  Constitutional: Oriented to person, place, and time.  Appears well-developed and well-nourished.  Head: Normocephalic and atraumatic.  Eyes: Pupils are equal, round, and reactive to light.  Cardiovascular: Intact distal pulses.  Pulmonary/Chest/Breast: Effort normal. No respiratory distress.  Neurological: Alert and oriented to person, place, and time.  Skin: Skin is warm and dry.  Psychiatric: normal mood and affect.  Behavior is normal.  Musculoskeletal: Left hand reveals absence of any significant swelling, skin changes, or deformities. Functional wrist, forearm, and hand range of motion with functional  strength, somewhat asymmetric given his prior stroke.       Personal Interpretation  of Diagnostic studies: Xrays of left wrist taken today demonstrate abundant healing response of extraarticular fracture of left distal radius with healing and near anatomical alignment.        Impression: Healed fracture left distal radius       Plan: I have no restrictions for him and he can wear the splint as needed and can continue with activities as tolerated. I am happy to see him in the future should he have any questions or concerns.       Follow up: As needed             Tyree Maier MD  [facility]  Department of Orthopaedic Surgery  Hand and Upper Extremity Reconstruction    Scribe Attestation  By signing my name below, I, Jorge Worley , Teresa   attest that this documentation has been prepared under the direction and in the presence of Dr. Tyree Maier.    Dictation performed with the use of voice recognition software.  Syntax and grammatical errors may exist.

## 2024-08-20 ENCOUNTER — APPOINTMENT (OUTPATIENT)
Dept: PHYSICAL THERAPY | Facility: CLINIC | Age: 79
End: 2024-08-20
Payer: MEDICARE

## 2024-08-27 ENCOUNTER — TREATMENT (OUTPATIENT)
Dept: PHYSICAL THERAPY | Facility: CLINIC | Age: 79
End: 2024-08-27
Payer: MEDICARE

## 2024-08-27 DIAGNOSIS — R29.898 LEFT LEG WEAKNESS: ICD-10-CM

## 2024-08-27 DIAGNOSIS — R26.2 DIFFICULTY WALKING: ICD-10-CM

## 2024-08-27 DIAGNOSIS — Z91.81 AT RISK FOR FALLS: ICD-10-CM

## 2024-08-27 PROCEDURE — 97110 THERAPEUTIC EXERCISES: CPT | Mod: GP | Performed by: PHYSICAL THERAPIST

## 2024-08-27 PROCEDURE — 97112 NEUROMUSCULAR REEDUCATION: CPT | Mod: GP | Performed by: PHYSICAL THERAPIST

## 2024-08-27 ASSESSMENT — PAIN - FUNCTIONAL ASSESSMENT: PAIN_FUNCTIONAL_ASSESSMENT: 0-10

## 2024-08-27 ASSESSMENT — PAIN SCALES - GENERAL: PAINLEVEL_OUTOF10: 0 - NO PAIN

## 2024-08-27 NOTE — PROGRESS NOTES
Physical Therapy    Physical Therapy Treatment    Patient Name: Evangelist Infante  MRN: [id number]  Today's Date: 8/27/2024  Visit number: 6  [facility]  Primary dx. =Difficulty walking  Time Entry:   Time Calculation  Start Time: 1133  Stop Time: 1215  Time Calculation (min): 42 min     PT Therapeutic Procedures Time Entry  Neuromuscular Re-Education Time Entry: 31  Therapeutic Exercise Time Entry: 11       Assessment:  Pt. Returned today after following up with his podiatrist. The podiatrist reported that the patient was developing calluses and advised the patient to keep an eye on the calluses each day and back off of intensity of what he is doing if he sees them build back up.  The patient also reported that he was doing more than 3 sets of his resisted band exercises at home and was performing 15 sets.  Reviewed the patient's HEP and discussed that pt. Should only be performing 10-15 feet of the resisted band exercises 3-4 sets every other day.  The patient continues to benefit from utilization of carbon fiber AFO on LLE, pt. Demonstrates increased L heel strike, increased fluidity and increased gait speed with AFO.         Plan: balance on uneven surfaces, resisted walking       Current Problem  1. Left leg weakness  Follow Up In Physical Therapy      2. Difficulty walking  Follow Up In Physical Therapy      3. At risk for falls  Follow Up In Physical Therapy                Subjective    Pt. Reports that he went to the podiatrist and they found that he had calluses, he told him to watch his feet every day and back off on the intensity of what he is doing if he notices any calluses.  Pt. Reports that he was performing 10-15 sets of his exercises vs. 3 sets.      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 Arrived to visit with SPC at [facility]  level    Treatments:    There Ex:  -recumbent stepper with RUE and BLE's at L2.5 for 10'x1 with VC to keep RPM's above 70 to improve LE strength, cardiovascular endurance  -reprinted pt. Lateral walkout and monster walkout exercises discussed     Neuro Re-ED:  -step up/down on foam, resisted posteriorly x10 on each side with CGA->min-A  -functional mobility through agility ladder x6 laps reciprocal pattern with CGA  -obstacle course with foam, x3 hurdles and x2 sets of 2 river rocks with CGA->min-A:   1st bout=46 sec.   2nd bout= 28 sec.   3rd bout=27 sec.   4th bout=24 sec.    -110' timed and step-counting, resisted ambulation F direction   1st bout=82 steps    2nd bout=64 steps 44 sec.   3rd bout=61 steps, 40 sec.  -resisted backwards walking 30'x2 with no device with SBA    OP EDUCATION:  -daily walks  -sit<>Stands  -resisted walking F/laterally       Goals:

## 2024-09-04 ENCOUNTER — TREATMENT (OUTPATIENT)
Dept: PHYSICAL THERAPY | Facility: CLINIC | Age: 79
End: 2024-09-04
Payer: MEDICARE

## 2024-09-04 DIAGNOSIS — Z91.81 AT RISK FOR FALLS: ICD-10-CM

## 2024-09-04 DIAGNOSIS — R26.2 DIFFICULTY WALKING: ICD-10-CM

## 2024-09-04 DIAGNOSIS — R29.898 LEFT LEG WEAKNESS: ICD-10-CM

## 2024-09-04 PROCEDURE — 97112 NEUROMUSCULAR REEDUCATION: CPT | Mod: GP | Performed by: PHYSICAL THERAPIST

## 2024-09-04 PROCEDURE — 97110 THERAPEUTIC EXERCISES: CPT | Mod: GP | Performed by: PHYSICAL THERAPIST

## 2024-09-04 ASSESSMENT — PAIN SCALES - GENERAL: PAINLEVEL_OUTOF10: 0 - NO PAIN

## 2024-09-04 ASSESSMENT — PAIN - FUNCTIONAL ASSESSMENT: PAIN_FUNCTIONAL_ASSESSMENT: 0-10

## 2024-09-04 NOTE — PROGRESS NOTES
"Physical Therapy    Physical Therapy Treatment    Patient Name: Evangelist Infante  MRN: [id number]  Today's Date: 9/4/2024  Visit number: 8  [facility]  Primary dx. =Difficulty walking  Time Entry:   Time Calculation  Start Time: 1123  Stop Time: 1207  Time Calculation (min): 44 min     PT Therapeutic Procedures Time Entry  Neuromuscular Re-Education Time Entry: 37  Therapeutic Exercise Time Entry: 8       Assessment:  Pt. Completed treadmill ambulation today with BUE support, the patient demonstrated difficulty maintaining normal gait pattern with tendency to \"push\" treadmill\" which led to abnormal gait.  Therefore, plan on implementing overground functional mobility training in future visits.  Pt. Occasionally catches his left lower extremity on obstacles without AFO.  Pt. Will benefit from AFO on LLE to decrease fall risk, especially on uneven terrain.  Pt. Is demonstrating quicker compensatory stepping strategies, however pt. Often requires multiple steps and will require further practice to improve reactive balance.     Plan: balance on uneven surfaces, progress check in next 1-2 visits       Current Problem  1. Left leg weakness  Follow Up In Physical Therapy      2. Difficulty walking  Follow Up In Physical Therapy      3. At risk for falls  Follow Up In Physical Therapy                Subjective    Pt. Reports that he went into [facility] and ordered a brace.  They measured him.     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 Arrived to visit with SPC at mod-I level    Treatments:    There Ex:  -treadmill ambulation with BUE support 3'x2 with max VC for LONG steps (inc. Dependence on BUE's)  -SBA to get on and off treadmill laterally    Neuro Re-ED:  -step up/down on foam forward, resisted posteriorly x10 on each side with CGA  -step " up/down on foam laterally with BTB on thighs x10 on each with CGA->min-A  -blaze pods with lateral negotiation over hurdles x2 and SBQC with pods on 8-inch, 9-inch steps, x4 pods total, color catch, 2 colors: red=right, blue=left:   1st bout=3 reps   2nd bout=3 reps   -obstacle course with XL foam, x3 hurdles and x4 river rocks with CGA with AFO on LLE x4 bouts      OP EDUCATION:  -daily walks  -sit<>Stands  -resisted walking F/laterally       Goals:

## 2024-09-13 ENCOUNTER — APPOINTMENT (OUTPATIENT)
Dept: PHYSICAL THERAPY | Facility: CLINIC | Age: 79
End: 2024-09-13
Payer: MEDICARE

## 2024-09-13 DIAGNOSIS — I69.354 HEMIPARESIS OF LEFT NONDOMINANT SIDE AS LATE EFFECT OF CEREBRAL INFARCTION (MULTI): ICD-10-CM

## 2024-09-13 DIAGNOSIS — R26.2 DIFFICULTY WALKING: Primary | Chronic | ICD-10-CM

## 2024-09-13 DIAGNOSIS — R29.898 LEFT LEG WEAKNESS: ICD-10-CM

## 2024-09-13 DIAGNOSIS — Z91.81 AT RISK FOR FALLS: ICD-10-CM

## 2024-09-13 PROCEDURE — 97112 NEUROMUSCULAR REEDUCATION: CPT | Mod: GP | Performed by: PHYSICAL THERAPIST

## 2024-09-13 PROCEDURE — 97110 THERAPEUTIC EXERCISES: CPT | Mod: GP | Performed by: PHYSICAL THERAPIST

## 2024-09-13 ASSESSMENT — PAIN SCALES - GENERAL: PAINLEVEL_OUTOF10: 0 - NO PAIN

## 2024-09-13 ASSESSMENT — PAIN - FUNCTIONAL ASSESSMENT: PAIN_FUNCTIONAL_ASSESSMENT: 0-10

## 2024-09-13 NOTE — PROGRESS NOTES
Physical Therapy    Physical Therapy Treatment    Patient Name: Evangelist Infante  MRN: [id number]  Today's Date: 9/16/2024  Visit number: 9  [facility]  Primary dx. =Difficulty walking  Time Entry:   Time Calculation  Start Time: 1301  Stop Time: 1345  Time Calculation (min): 44 min     PT Therapeutic Procedures Time Entry  Neuromuscular Re-Education Time Entry: 34  Therapeutic Exercise Time Entry: 10       Assessment:  Mr. Hirsch completed circuit training today, requiring seated rest breaks due to fatigue and HR increasing to higher levels.  Due to pt. Cardiac history pt. Was given rest breaks to allow pt. HR to return to baseline in between each exercise. Pt. Was particularly challenged with tall kneeling squats and had a hamstring cramp which limited him from completing any half kneeling exercises.  The patient demonstrated postural stability during dynamic sagittal and lateral planes.      Plan: balance on uneven surfaces, circuit training, review tall kneeling/half kneeling for HEP       Current Problem  1. Difficulty walking  Follow Up In Physical Therapy      2. Left leg weakness  Follow Up In Physical Therapy      3. At risk for falls  Follow Up In Physical Therapy      4. Hemiparesis of left nondominant side as late effect of cerebral infarction (Multi)                  Subjective    Pt. Reports that he is going to get his AFO in about 3 weeks.  He is using his CPAP machine very little.  Pt. Is doing the recumbent bike 3 times a week, he goes 10 minutes at a time.     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 Arrived to visit with SPC at mod-I level    Treatments:    There Ex:  Recumbent cycle bike with BLE's only, level 1.5, VC to keep RPM's above 60, 10'x1    Neuro Re-ED:  Circuit training x2  bouts total with rest breaks in between each activity:  -tall kneeling squats x20 reps with BTB resistance posteriorly (MO=524 BPM) with VC for inc. L sided WS  -step up/down on foam on 4 inch step forward, resisted posteriorly x10 on each side with CGA with VC for ant. WS   -obstacle course with XL foam, x3 hurdles and x5 river rocks with CGA with AFO on LLE [id number] resisted posteriorly with BTB    -step up/down on foam laterally to the L side only resisted x10 on each with CGA    -blaze pods with lateral negotiation over hurdles x2 and SBQC with pods on 8-inch, 9-inch steps, x4 pods total, color catch, 2 colors: red=right, blue=left with CGA:   1st bout=3 reps   2nd bout=3 reps     OP EDUCATION:  -daily walks  -sit<>Stands  -resisted walking F/laterally       Goals:

## 2024-09-16 ASSESSMENT — PAIN - FUNCTIONAL ASSESSMENT: PAIN_FUNCTIONAL_ASSESSMENT: 0-10

## 2024-09-16 ASSESSMENT — PAIN SCALES - GENERAL: PAINLEVEL_OUTOF10: 0 - NO PAIN

## 2024-09-17 DIAGNOSIS — N40.1 BENIGN PROSTATIC HYPERPLASIA WITH LOWER URINARY TRACT SYMPTOMS, SYMPTOM DETAILS UNSPECIFIED: ICD-10-CM

## 2024-09-17 RX ORDER — TAMSULOSIN HYDROCHLORIDE 0.4 MG/1
CAPSULE ORAL
Qty: 100 CAPSULE | Refills: 2 | Status: SHIPPED | OUTPATIENT
Start: 2024-09-17

## 2024-09-19 ENCOUNTER — APPOINTMENT (OUTPATIENT)
Dept: PHYSICAL THERAPY | Facility: CLINIC | Age: 79
End: 2024-09-19
Payer: MEDICARE

## 2024-09-19 DIAGNOSIS — R29.898 LEFT LEG WEAKNESS: ICD-10-CM

## 2024-09-19 DIAGNOSIS — Z91.81 AT RISK FOR FALLS: ICD-10-CM

## 2024-09-19 DIAGNOSIS — R26.2 DIFFICULTY WALKING: Primary | Chronic | ICD-10-CM

## 2024-09-19 PROCEDURE — 97110 THERAPEUTIC EXERCISES: CPT | Mod: GP | Performed by: PHYSICAL THERAPIST

## 2024-09-19 PROCEDURE — 97112 NEUROMUSCULAR REEDUCATION: CPT | Mod: GP | Performed by: PHYSICAL THERAPIST

## 2024-09-19 PROCEDURE — 97530 THERAPEUTIC ACTIVITIES: CPT | Mod: GP | Performed by: PHYSICAL THERAPIST

## 2024-09-19 ASSESSMENT — PAIN SCALES - GENERAL: PAINLEVEL_OUTOF10: 0 - NO PAIN

## 2024-09-19 ASSESSMENT — PAIN - FUNCTIONAL ASSESSMENT: PAIN_FUNCTIONAL_ASSESSMENT: 0-10

## 2024-09-19 NOTE — PROGRESS NOTES
Physical Therapy    Physical Therapy Treatment    Patient Name: Evangelist Infante  MRN: [id number]  Today's Date: 9/19/2024  Visit number: 10  [facility]  Primary dx. =Difficulty walking  Time Entry:   Time Calculation  Start Time: 1530  Stop Time: 1615  Time Calculation (min): 45 min     PT Therapeutic Procedures Time Entry  Neuromuscular Re-Education Time Entry: 22  Therapeutic Exercise Time Entry: 10  Therapeutic Activity Time Entry: 13       Assessment:  During circuit training pt. Required seated rest breaks in between every 1-2 exercises due to fatigue.  The patient demonstrated improved left ankle stability and left heel strike during functional mobility with carbon fiber AFO on LLE. Pt. Ambulated outdoors on uneven surfaces without a device at SBA level.  Pt. Demonstrated good postural stability on level surfaces outdoors, however pt. Was challenged with small ramps outdoors.  Pt. Demonstrated safer functional mobility outdoors with SPC vs. No device.      Plan: balance on uneven surfaces, circuit training, review tall kneeling/half kneeling for HEP       Current Problem  1. Difficulty walking  Follow Up In Physical Therapy      2. Left leg weakness  Follow Up In Physical Therapy      3. At risk for falls  Follow Up In Physical Therapy                Subjective    Pt. Reports that he has had some cramping in his left leg at night, he takes mustard and drinks water and that helps to make it go away.      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 Arrived to visit with SPC at mod-I level    Treatments:    There Ex:  Recumbent cycle bike with BLE's only, level 2.0, VC to keep RPM's above 60, 10'x1    Neuro Re-ED: (performed will carbon fiber AFO on LLE)  Circuit training x2 bouts total with rest breaks in  between each activity all performed with AFO on LLE:  -tall kneeling squats x20 reps with BTB resistance posteriorly (KA=795 BPM) with VC for inc. L sided WS  -resisted functional mobility with light green band 110'x1   -obstacle course with XL foam with lisandro, x32 hurdles and x4 river rocks and regular airex with CGA with AFO on LLE [id number] resisted posteriorly with pink rubber band   -ascend/descend stairs with Rue support x3 bouts with railing (did not perform on 2nd round)    There Act: (performed with AFO on LLE)  -functional mobility outdoors on uneven concrete, ascend/descend ramps, 100'x2 with no device with SBA  -curb negotiation with no device at mod-I level  -curb negotiation with SPC at mod-I level     OP EDUCATION:  -daily walks  -sit<>Stands  -resisted walking F/laterally       Goals:

## 2024-09-27 ENCOUNTER — APPOINTMENT (OUTPATIENT)
Dept: PHYSICAL THERAPY | Facility: CLINIC | Age: 79
End: 2024-09-27
Payer: MEDICARE

## 2024-10-02 ENCOUNTER — APPOINTMENT (OUTPATIENT)
Dept: PHYSICAL THERAPY | Facility: CLINIC | Age: 79
End: 2024-10-02
Payer: MEDICARE

## 2024-10-02 DIAGNOSIS — Z91.81 AT RISK FOR FALLS: ICD-10-CM

## 2024-10-02 DIAGNOSIS — R26.2 DIFFICULTY WALKING: Chronic | ICD-10-CM

## 2024-10-02 DIAGNOSIS — R29.898 LEFT LEG WEAKNESS: ICD-10-CM

## 2024-10-02 PROCEDURE — 97112 NEUROMUSCULAR REEDUCATION: CPT | Mod: GP | Performed by: PHYSICAL THERAPIST

## 2024-10-02 PROCEDURE — 97530 THERAPEUTIC ACTIVITIES: CPT | Mod: GP | Performed by: PHYSICAL THERAPIST

## 2024-10-02 ASSESSMENT — PAIN - FUNCTIONAL ASSESSMENT: PAIN_FUNCTIONAL_ASSESSMENT: 0-10

## 2024-10-02 ASSESSMENT — BALANCE ASSESSMENTS
TRANSFERS: ABLE TO TRANSFER SAFELY WITH MINOR USE OF HANDS
TURN 360 DEGREES: ABLE TO TURN 360 DEGREES SAFELY BUT SLOWLY
LONG VERSION TOTAL SCORE (MAX 56): 49
REACHING FORWARD WITH OUTSTRETCHED ARM WHILE STANDING: CAN REACH FORWARD CONFIDENTLY 25 CM (10 INCHES)
STANDING UNSUPPORTED WITH FEET TOGETHER: ABLE TO PLACE FEET TOGETHER INDEPENDENTLY AND STAND 1 MINUTE SAFELY
STANDING ON ONE LEG: TRIES TO LIFT LEG UNABLE TO HOLD 3 SECONDS BUT REMAINS STANDING INDEPENDENTLY
SITTING WITH BACK UNSUPPORTED BUT FEET SUPPORTED ON FLOOR OR ON A STOOL: ABLE TO SIT SAFELY AND SECURELY FOR 2 MINUTES
STANDING TO SITTING: SITS SAFELY WITH MINIMAL USE OF HANDS
STANDING TO SITTING: ABLE TO STAND WITHOUT USING HANDS AND STABILIZE INDEPENDENTLY
PLACE ALTERNATE FOOT ON STEP OR STOOL WHILE STANDING UNSUPPORTED: ABLE TO STAND INDEPENDENTLY AND COMPLETE 8 STEPS IN GREATER THAN 20 SECONDS
PICK UP OBJECT FROM THE FLOOR FROM A STANDING POSITION: ABLE TO PICK UP SLIPPER SAFELY AND EASILY
STANDING UNSUPPORTED: ABLE TO STAND SAFELY FOR 2 MINUTES
STANDING UNSUPPORTED ONE FOOT IN FRONT: ABLE TO PLACE FOOT AHEAD INDEPENDENTLY AND HOLD 30 SECONDS
PLACE ALTERNATE FOOT ON STEP OR STOOL WHILE STANDING UNSUPPORTED: LOOKS BEHIND FROM BOTH SIDES AND WEIGHT SHIFTS WELL
STANDING UNSUPPORTED WITH EYES CLOSED: ABLE TO STAND 10 SECONDS SAFELY

## 2024-10-02 ASSESSMENT — PAIN SCALES - GENERAL: PAINLEVEL_OUTOF10: 0 - NO PAIN

## 2024-10-02 NOTE — PROGRESS NOTES
Physical Therapy    Physical Therapy Treatment/Discharge    Patient Name: Evangelist Infante  MRN: [id number]  Today's Date: 10/2/2024  Visit number: 11  [facility]  Primary dx. =Difficulty walking  Time Entry:                 Assessment:  Mr. Infante has made excellent progress in physical therapy with interventions focused on improving dynamic balance and functional mobility speed and endurance.  The patient has met 6/7 of his long term goals and is ready to discharge from physical therapy at this time.  The patient improved his functional strength as evidenced in the 30 second sit to stand test meeting LTG and MCID of 2 repetitions. Mr. Infante improved his gait speed as evidenced in his improvement in the 10MWT and 6MWT and exceeded his goal for the 10MWT.  The patient improved his static balance as evidenced in the TRACY, improving by 7 points compared to MDC of 2.8 points and is now above fall risk cut off score of a 45/56.  Pt. Plans to get his carbon fiber AFO soon, I recommended this adaptive device in order to improve his safety durign swing phase of gait and reduce fall risk and it has worked well in therapy thus far.  At this time pt. Has reached his maximum potential and is ready to discharge to [facility].     Plan: discharge       Current Problem  1. Left leg weakness  Follow Up In Physical Therapy      2. Difficulty walking  Follow Up In Physical Therapy      3. At risk for falls  Follow Up In Physical Therapy                  Subjective    Pt. Reports that he is getting his new brace on Tuesday. He feels like his left leg is giving his problems.  He is getting a lot of cramps in his left leg.  He gets cramps a lot at night.  He eats mustard and that helps.  He has a recumbent bike in his basement and does this every other day and does this 10-12 minutes.      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 Arrived to visit with SPC at mod-I level    TRACY=  10/2/24=49/56  IE=42/56    10MWT:  10/2/24=13.5 sec. \"Normal\" (.74 meters/second)  10/2/24=11.2 sec. \"Fast\"   IE=18. 5 seconds normal with no device with no device (.54 meters/second)  -15.8 seconds with no devices at \"fast\" pace with SBA     30 second sit<>stand test:  10/2/24=10 reps with unilateral UE support  IE=8 reps with unilateral UE support       6 min Walk Test:   10/2/44=021 ft. Stopping at 3'50\" With AFO and SPC, no LOB (.76 meters/second)  RJ=344 ft. with SPC with SBA, multiple instances of L foot catching but no loss of balance. Pt. stopped at 3'20\"(speed .65 meters/second)     Treatments:    Neuro Re-ED:   Tracy Balance Scale test performed with SBA throughout  1.Sitting to standing   2.Standing unsupported 2 min.  3.Sitting with back unsupported but feet supported on floor or on a stool 2 min.   4.Standing to sitting   5.Transfers=   6.Standing unsupported with eyes closed= 10 sec.  7.Standing unsupported with feet together=1 min.  8.Reaching forward with outstretched arm while standing= 10 cm  9. object from the floor from a standing position=   10. Turning to look behind over left and right shoulders while standing=   11. Turn 360 degrees=   12. Place alternate foot on step or stool while standing unsupported=  13. Standing unsupported on foot in front  14. Standing on one leg=   Reviewed score on TRACY, discussed fall risk cut off score, educated pt. On utilization of device due to score being below cut off score of 45/56.   -reactive balance F=1 step, B=2 steps, L=2 steps, R=1 step  -half kneeling to standing with LLE x1    There Act:   -functional with 574 ft. With AFO on LLE with SPC 3'50\"  -sit<>stands x10 with unilateral UE support  -functional mobility 30'x2 with no device, no AFO  -sit<>stands with no UE support x5 from mat " table, VC to keep BLE's even    OP EDUCATION:  -daily walks  -sit<>Stands no Ue support daily  -resisted walking F/laterally 3x/week  -half kneeling to standing next to couch 2-3x/week       Goals:

## 2024-10-10 ENCOUNTER — APPOINTMENT (OUTPATIENT)
Dept: PRIMARY CARE | Facility: CLINIC | Age: 79
End: 2024-10-10
Payer: MEDICARE

## 2024-10-10 VITALS
OXYGEN SATURATION: 94 % | DIASTOLIC BLOOD PRESSURE: 89 MMHG | HEART RATE: 85 BPM | WEIGHT: 198 LBS | BODY MASS INDEX: 27.62 KG/M2 | SYSTOLIC BLOOD PRESSURE: 178 MMHG | TEMPERATURE: 98.1 F

## 2024-10-10 DIAGNOSIS — D64.9 ANEMIA, UNSPECIFIED TYPE: Primary | ICD-10-CM

## 2024-10-10 DIAGNOSIS — R31.21 ASYMPTOMATIC MICROSCOPIC HEMATURIA: ICD-10-CM

## 2024-10-10 DIAGNOSIS — I10 HYPERTENSION, UNSPECIFIED TYPE: ICD-10-CM

## 2024-10-10 DIAGNOSIS — Z23 FLU VACCINE NEED: ICD-10-CM

## 2024-10-10 DIAGNOSIS — I77.819 AORTIC DILATATION (CMS-HCC): ICD-10-CM

## 2024-10-10 DIAGNOSIS — E78.2 MIXED HYPERLIPIDEMIA: ICD-10-CM

## 2024-10-10 LAB
ALBUMIN SERPL BCP-MCNC: 3.6 G/DL (ref 3.4–5)
ALP SERPL-CCNC: 95 U/L (ref 45–117)
ALT SERPL W P-5'-P-CCNC: 33 U/L (ref 14–59)
ANION GAP SERPL CALC-SCNC: 12 MMOL/L (ref 10–20)
AST SERPL W P-5'-P-CCNC: 24 U/L (ref 15–37)
BASOPHILS # BLD AUTO: 0.01 X10*3/UL (ref 0.1–1.6)
BASOPHILS NFR BLD AUTO: 0.17 % (ref 0–0.3)
BILIRUB SERPL-MCNC: 1 MG/DL (ref 0.2–1)
BUN SERPL-MCNC: 34 MG/DL (ref 7–18)
CALCIUM SERPL-MCNC: 9.1 MG/DL (ref 8.5–10.1)
CHLORIDE SERPL-SCNC: 99 MMOL/L (ref 98–107)
CHOLEST SERPL-MCNC: 119 MG/DL (ref 0–199)
CHOLESTEROL/HDL RATIO: 1.9 (ref 4.2–7)
CO2 SERPL-SCNC: 27 MMOL/L (ref 21–32)
CREAT SERPL-MCNC: 1.25 MG/DL (ref 0.6–1.1)
EGFRCR SERPLBLD CKD-EPI 2021: 58 ML/MIN/1.73M*2
EOSINOPHIL # BLD AUTO: 0.27 X10*3/UL (ref 0.04–0.5)
EOSINOPHIL NFR BLD AUTO: 3.2 % (ref 0.7–7)
ERYTHROCYTE [DISTWIDTH] IN BLOOD BY AUTOMATED COUNT: 15.1 % (ref 11.5–14.5)
GLUCOSE SERPL-MCNC: 160 MG/DL (ref 74–100)
HCT VFR BLD AUTO: 44 % (ref 38.4–51.3)
HDLC SERPL-MCNC: 62 MG/DL (ref 40–59)
HGB BLD-MCNC: 14.88 G/DL (ref 12.7–17)
IS PATIENT FASTING: ABNORMAL
LDLC SERPL DIRECT ASSAY-MCNC: 48 MG/DL (ref 0–100)
LYMPHOCYTES # BLD AUTO: 1.99 X10*3/UL (ref 0–6)
LYMPHOCYTES NFR BLD AUTO: 23.86 % (ref 20.5–51.1)
MCH RBC QN AUTO: 32.5 PG (ref 26–32)
MCHC RBC AUTO-ENTMCNC: 33.8 G/DL (ref 31–38)
MCV RBC AUTO: 96 FL (ref 80–96)
MONOCYTES # BLD AUTO: 0.88 X10*3/UL (ref 1.6–24.9)
MONOCYTES NFR BLD AUTO: 10.56 % (ref 1.7–9.3)
NEUTROPHILS # BLD AUTO: 5.2 X10*3/UL (ref 1.4–6.5)
NEUTROPHILS NFR BLD AUTO: 62.21 % (ref 42.2–75.2)
PLATELET # BLD AUTO: 171.4 X10*3/UL (ref 150–450)
PMV BLD AUTO: 9.06 FL (ref 7.8–11)
POTASSIUM SERPL-SCNC: 4.3 MMOL/L (ref 3.5–5.1)
PROT SERPL-MCNC: 7.8 G/DL (ref 6.4–8.2)
RBC # BLD AUTO: 4.58 X10*6/UL (ref 4.1–5.6)
SODIUM SERPL-SCNC: 134 MMOL/L (ref 136–145)
TRIGL SERPL-MCNC: 47 MG/DL
WBC # BLD AUTO: 8.36 X10*3/UL (ref 4.5–10.5)

## 2024-10-10 PROCEDURE — 3079F DIAST BP 80-89 MM HG: CPT | Performed by: INTERNAL MEDICINE

## 2024-10-10 PROCEDURE — 99214 OFFICE O/P EST MOD 30 MIN: CPT | Performed by: INTERNAL MEDICINE

## 2024-10-10 PROCEDURE — 80053 COMPREHEN METABOLIC PANEL: CPT | Performed by: INTERNAL MEDICINE

## 2024-10-10 PROCEDURE — 1159F MED LIST DOCD IN RCRD: CPT | Performed by: INTERNAL MEDICINE

## 2024-10-10 PROCEDURE — 90662 IIV NO PRSV INCREASED AG IM: CPT | Performed by: INTERNAL MEDICINE

## 2024-10-10 PROCEDURE — 85025 COMPLETE CBC W/AUTO DIFF WBC: CPT

## 2024-10-10 PROCEDURE — 1036F TOBACCO NON-USER: CPT | Performed by: INTERNAL MEDICINE

## 2024-10-10 PROCEDURE — 3077F SYST BP >= 140 MM HG: CPT | Performed by: INTERNAL MEDICINE

## 2024-10-10 PROCEDURE — 1160F RVW MEDS BY RX/DR IN RCRD: CPT | Performed by: INTERNAL MEDICINE

## 2024-10-10 PROCEDURE — G0008 ADMIN INFLUENZA VIRUS VAC: HCPCS | Performed by: INTERNAL MEDICINE

## 2024-10-10 PROCEDURE — 80061 LIPID PANEL: CPT | Performed by: INTERNAL MEDICINE

## 2024-10-10 ASSESSMENT — PATIENT HEALTH QUESTIONNAIRE - PHQ9
SUM OF ALL RESPONSES TO PHQ9 QUESTIONS 1 AND 2: 0
1. LITTLE INTEREST OR PLEASURE IN DOING THINGS: NOT AT ALL
2. FEELING DOWN, DEPRESSED OR HOPELESS: NOT AT ALL

## 2024-10-10 ASSESSMENT — ENCOUNTER SYMPTOMS
DEPRESSION: 0
LOSS OF SENSATION IN FEET: 0
OCCASIONAL FEELINGS OF UNSTEADINESS: 0

## 2024-10-10 NOTE — PROGRESS NOTES
Subjective   Patient ID: Evangelist Infante is a 79 y.o. male who presents for Follow-up (Sleep apnea test result).    HPI   79 years old male comes in to see me today for a regular checkup.  We treated him for multiple medical issues including CVA in the past.  Hypertension, hyperlipidemia, C. difficile in the past, wound on his right foot healed finally.  His medications reviewed and reconciled.  He takes amlodipine 2.5 mg a day.  Eliquis 5 mg twice a day.  Atenolol 50 mg in the evening and 100 mg in the morning.  Lipitor 40 mg a day.  Zetia 10 mg a day.  Glimepiride 1 tablet as needed.  Check his sugar if its above 150 he takes it.  Hydralazine twice a day 10 mg ..  Tamsulosin or Flomax 0.4 mg a day and Diovan 320 mg a day.    Review of Systems  Review of system of 12 system are negative except left sided weakness from his stroke.  He did very well at physical therapy and he continues to do his physical therapy at home.  Objective   /89 (BP Location: Right arm, Patient Position: Sitting, BP Cuff Size: Adult)   Pulse 85   Temp 36.7 °C (98.1 °F) (Temporal)   Wt 89.8 kg (198 lb)   SpO2 94%   BMI 27.62 kg/m²     Physical Exam  Alert oriented pleasant cooperative in no distress.  Nonicteric sclera no jaundice.  Face symmetrical cranial nerves intact.  Neck supple no masses lymph nodes or thyromegaly.  Lungs clear no rales wheezing or crackles.  Heart normal S1 and S2 regular rhythm.  Abdomen benign nontender no masses no organomegaly no pain on palpations.  Neurologically intact.  No skin rashes or lesions.  No signs of failure.  Will call him with lab results as soon as available.  He had the flu shots today.  No refill needed.  Discussed his weight gain and he is aware that he should not be up 198 , hopefully he will lose weight and he stated he will.  Assessment/Plan   Diagnoses and all orders for this visit:  Anemia, unspecified type  -     CBC w/5 Part Differential, [facility]  Hypertension, unspecified  type  -     Comprehensive Metabolic Panel  Mixed hyperlipidemia  -     Lipid Panel  Asymptomatic microscopic hematuria  -     POCT UA (Automated) docked device  Flu vaccine need  -     Flu vaccine, trivalent, preservative free, HIGH-DOSE, age 65y+ (Fluzone)  Aortic dilatation (CMS-HCC)

## 2024-10-15 ENCOUNTER — TELEPHONE (OUTPATIENT)
Dept: PRIMARY CARE | Facility: CLINIC | Age: 79
End: 2024-10-15
Payer: MEDICARE

## 2024-10-15 DIAGNOSIS — E11.69 TYPE 2 DIABETES MELLITUS WITH OTHER SPECIFIED COMPLICATION, WITHOUT LONG-TERM CURRENT USE OF INSULIN: ICD-10-CM

## 2024-10-15 RX ORDER — GLIMEPIRIDE 2 MG/1
4 TABLET ORAL DAILY
Qty: 180 TABLET | Refills: 3 | Status: CANCELLED | OUTPATIENT
Start: 2024-10-15

## 2024-10-15 NOTE — TELEPHONE ENCOUNTER
----- Message from Cooper Kong sent at 10/13/2024 10:09 AM EDT -----  Regarding: r  Lab results from last visit reveals normal cbc, lipids panel,      Blood sugar is high 160,  renal function is down from 68 to 58 , you should not be taking diabetic medication as needed basis, you can not control blood sugar this way , ( only by using fast acting Insulin you may bring your sugar down ) , the oral medication like glucotrol or glipizide  etc..... won t do it     So you have to take it daily  or we need to find the proper medication to control it ?

## 2024-10-23 DIAGNOSIS — E11.69 TYPE 2 DIABETES MELLITUS WITH OTHER SPECIFIED COMPLICATION, WITHOUT LONG-TERM CURRENT USE OF INSULIN: ICD-10-CM

## 2024-10-23 RX ORDER — GLIMEPIRIDE 2 MG/1
4 TABLET ORAL DAILY
Qty: 180 TABLET | Refills: 3 | Status: SHIPPED | OUTPATIENT
Start: 2024-10-23

## 2024-11-16 DIAGNOSIS — I10 HYPERTENSION, UNSPECIFIED TYPE: ICD-10-CM

## 2024-11-21 RX ORDER — ATENOLOL 100 MG/1
TABLET ORAL
Qty: 100 TABLET | Refills: 2 | Status: SHIPPED | OUTPATIENT
Start: 2024-11-21

## 2024-11-22 DIAGNOSIS — K21.9 GASTROESOPHAGEAL REFLUX DISEASE, UNSPECIFIED WHETHER ESOPHAGITIS PRESENT: ICD-10-CM

## 2024-11-26 RX ORDER — OMEPRAZOLE 20 MG/1
CAPSULE, DELAYED RELEASE ORAL
Qty: 100 CAPSULE | Refills: 2 | Status: SHIPPED | OUTPATIENT
Start: 2024-11-26

## 2024-12-12 DIAGNOSIS — I10 HYPERTENSION, UNSPECIFIED TYPE: Primary | ICD-10-CM

## 2024-12-12 RX ORDER — AMLODIPINE BESYLATE 2.5 MG/1
2.5 TABLET ORAL
Qty: 90 TABLET | Refills: 3 | Status: SHIPPED | OUTPATIENT
Start: 2024-12-12 | End: 2025-12-12

## 2025-01-07 DIAGNOSIS — E78.5 HYPERLIPIDEMIA, UNSPECIFIED HYPERLIPIDEMIA TYPE: ICD-10-CM

## 2025-01-09 RX ORDER — ATORVASTATIN CALCIUM 40 MG/1
40 TABLET, FILM COATED ORAL DAILY
Qty: 100 TABLET | Refills: 2 | Status: SHIPPED | OUTPATIENT
Start: 2025-01-09

## 2025-01-24 DIAGNOSIS — I10 HYPERTENSION, UNSPECIFIED TYPE: ICD-10-CM

## 2025-01-24 RX ORDER — VALSARTAN 320 MG/1
320 TABLET ORAL DAILY
Qty: 90 TABLET | Refills: 3 | Status: SHIPPED | OUTPATIENT
Start: 2025-01-24

## 2025-05-13 ENCOUNTER — TELEPHONE (OUTPATIENT)
Dept: PRIMARY CARE | Facility: CLINIC | Age: 80
End: 2025-05-13
Payer: MEDICARE

## 2025-05-13 NOTE — TELEPHONE ENCOUNTER
Patient called for handicap Rx for 2 tags, need physician signature.  Please call patient see when he can come in for doctor signature.  [phone].

## 2025-05-15 ENCOUNTER — OFFICE VISIT (OUTPATIENT)
Dept: PRIMARY CARE | Facility: CLINIC | Age: 80
End: 2025-05-15
Payer: MEDICARE

## 2025-05-15 VITALS
BODY MASS INDEX: 27.34 KG/M2 | SYSTOLIC BLOOD PRESSURE: 172 MMHG | WEIGHT: 196 LBS | DIASTOLIC BLOOD PRESSURE: 78 MMHG | OXYGEN SATURATION: 93 % | HEART RATE: 72 BPM | TEMPERATURE: 97.8 F

## 2025-05-15 DIAGNOSIS — Z00.00 ROUTINE GENERAL MEDICAL EXAMINATION AT HEALTH CARE FACILITY: ICD-10-CM

## 2025-05-15 DIAGNOSIS — R31.21 ASYMPTOMATIC MICROSCOPIC HEMATURIA: ICD-10-CM

## 2025-05-15 DIAGNOSIS — E78.2 MIXED HYPERLIPIDEMIA: ICD-10-CM

## 2025-05-15 DIAGNOSIS — I10 HYPERTENSION, UNSPECIFIED TYPE: ICD-10-CM

## 2025-05-15 DIAGNOSIS — E11.9 TYPE 2 DIABETES MELLITUS WITHOUT COMPLICATION, WITHOUT LONG-TERM CURRENT USE OF INSULIN: Primary | ICD-10-CM

## 2025-05-15 DIAGNOSIS — I48.20 CHRONIC ATRIAL FIBRILLATION (MULTI): ICD-10-CM

## 2025-05-15 DIAGNOSIS — I69.993 CVA, OLD, ATAXIA: ICD-10-CM

## 2025-05-15 DIAGNOSIS — R26.2 DIFFICULTY WALKING: ICD-10-CM

## 2025-05-15 DIAGNOSIS — I25.10 CORONARY ARTERY DISEASE INVOLVING NATIVE CORONARY ARTERY OF NATIVE HEART, UNSPECIFIED WHETHER ANGINA PRESENT: ICD-10-CM

## 2025-05-15 LAB
APPEARANCE UR: CLEAR
BILIRUB UR QL STRIP: NEGATIVE
COLOR UR: YELLOW
GLUCOSE UR STRIP-MCNC: NEGATIVE MG/DL
HGB UR QL STRIP: ABNORMAL
KETONES UR STRIP-MCNC: NEGATIVE MG/DL
LEUKOCYTE ESTERASE UR QL STRIP: NEGATIVE
NITRITE UR QL STRIP: NEGATIVE
PH UR STRIP: 6 [PH]
PROT UR STRIP-MCNC: ABNORMAL MG/DL
SP GR UR STRIP.AUTO: 1.02
UROBILINOGEN UR STRIP-ACNC: 1 E.U./DL

## 2025-05-15 PROCEDURE — 1170F FXNL STATUS ASSESSED: CPT | Performed by: INTERNAL MEDICINE

## 2025-05-15 PROCEDURE — G0439 PPPS, SUBSEQ VISIT: HCPCS | Performed by: INTERNAL MEDICINE

## 2025-05-15 PROCEDURE — 1159F MED LIST DOCD IN RCRD: CPT | Performed by: INTERNAL MEDICINE

## 2025-05-15 PROCEDURE — 80061 LIPID PANEL: CPT | Performed by: INTERNAL MEDICINE

## 2025-05-15 PROCEDURE — 1158F ADVNC CARE PLAN TLK DOCD: CPT | Performed by: INTERNAL MEDICINE

## 2025-05-15 PROCEDURE — 81003 URINALYSIS AUTO W/O SCOPE: CPT | Performed by: INTERNAL MEDICINE

## 2025-05-15 PROCEDURE — 83036 HEMOGLOBIN GLYCOSYLATED A1C: CPT | Performed by: INTERNAL MEDICINE

## 2025-05-15 PROCEDURE — 1160F RVW MEDS BY RX/DR IN RCRD: CPT | Performed by: INTERNAL MEDICINE

## 2025-05-15 PROCEDURE — 1123F ACP DISCUSS/DSCN MKR DOCD: CPT | Performed by: INTERNAL MEDICINE

## 2025-05-15 PROCEDURE — 1036F TOBACCO NON-USER: CPT | Performed by: INTERNAL MEDICINE

## 2025-05-15 PROCEDURE — 3077F SYST BP >= 140 MM HG: CPT | Performed by: INTERNAL MEDICINE

## 2025-05-15 PROCEDURE — 80048 BASIC METABOLIC PNL TOTAL CA: CPT | Performed by: INTERNAL MEDICINE

## 2025-05-15 PROCEDURE — 3078F DIAST BP <80 MM HG: CPT | Performed by: INTERNAL MEDICINE

## 2025-05-15 PROCEDURE — 99214 OFFICE O/P EST MOD 30 MIN: CPT | Performed by: INTERNAL MEDICINE

## 2025-05-15 RX ORDER — ATENOLOL 50 MG/1
50 TABLET ORAL NIGHTLY
Qty: 100 TABLET | Refills: 2 | Status: SHIPPED | OUTPATIENT
Start: 2025-05-15

## 2025-05-15 ASSESSMENT — ACTIVITIES OF DAILY LIVING (ADL)
TOILETING: INDEPENDENT
JUDGMENT_ADEQUATE_SAFELY_COMPLETE_DAILY_ACTIVITIES: YES
WALKS IN HOME: INDEPENDENT
PATIENT'S MEMORY ADEQUATE TO SAFELY COMPLETE DAILY ACTIVITIES?: YES
HEARING - LEFT EAR: FUNCTIONAL
MANAGING FINANCES: INDEPENDENT
ASSISTIVE_DEVICE: EYEGLASSES;CANE;DENTURES PARTIAL
STIL DRIVING: YES
DOING HOUSEWORK: NEEDS ASSISTANCE
TAKING MEDICATION: INDEPENDENT
PREPARING MEALS: NEEDS ASSISTANCE
USING TELEPHONE: INDEPENDENT
BATHING: INDEPENDENT
ADEQUATE_TO_COMPLETE_ADL: YES
HEARING - RIGHT EAR: FUNCTIONAL
FEEDING YOURSELF: INDEPENDENT
NEEDS ASSISTANCE WITH FOOD: INDEPENDENT
EATING: INDEPENDENT
USING TRANSPORTATION: INDEPENDENT
PILL BOX USED: YES
GROCERY SHOPPING: NEEDS ASSISTANCE
GROOMING: INDEPENDENT
DRESSING YOURSELF: INDEPENDENT

## 2025-05-15 ASSESSMENT — GERIATRIC MINI NUTRITIONAL ASSESSMENT (MNA)
B WEIGHT LOSS DURING THE LAST 3 MONTHS: NO WEIGHT LOSS
E NEUROPSYCHOLOGICAL PROBLEMS: NO PSYCHOLOGICAL PROBLEMS
D HAS SUFFERED PSYCHOLOGICAL STRESS OR ACUTE DISEASE IN THE PAST 3 MONTHS?: NO
A HAS FOOD INTAKE DECLINED OVER THE PAST 3 MONTHS DUE TO LOSS OF APPETITE, DIGESTIVE PROBLEMS, CHEWING OR SWALLOWING DIFFICULTIES?: NO DECREASE IN FOOD INTAKE
C GENERAL MOBILITY: GOES OUT

## 2025-05-15 ASSESSMENT — ENCOUNTER SYMPTOMS
OCCASIONAL FEELINGS OF UNSTEADINESS: 0
DEPRESSION: 0
LOSS OF SENSATION IN FEET: 0

## 2025-05-15 ASSESSMENT — COGNITIVE AND FUNCTIONAL STATUS - GENERAL
TRAIL MAKING TEST: PATIENT COMPLETES TRAIL MAKING TEST PROPERLY.
VERBAL FLUENCY - ANIMAL NAMES (0 TO 25): 25

## 2025-05-15 ASSESSMENT — COLUMBIA-SUICIDE SEVERITY RATING SCALE - C-SSRS
1. IN THE PAST MONTH, HAVE YOU WISHED YOU WERE DEAD OR WISHED YOU COULD GO TO SLEEP AND NOT WAKE UP?: NO
2. HAVE YOU ACTUALLY HAD ANY THOUGHTS OF KILLING YOURSELF?: NO
6. HAVE YOU EVER DONE ANYTHING, STARTED TO DO ANYTHING, OR PREPARED TO DO ANYTHING TO END YOUR LIFE?: NO

## 2025-05-15 NOTE — PROGRESS NOTES
Subjective   Reason for Visit: Evangelist Infante is an 80 y.o. male here for a Medicare Wellness visit.          Reviewed all medications by prescribing practitioner or clinical pharmacist (such as prescriptions, OTCs, herbal therapies and supplements) and documented in the medical record.    HPI  80 years old male comes in to see me today for a subsequent Medicare wellness exam and follow-up visit.  Treat him for hypertension history of stroke diabetes mellitus type 2 hyperlipidemia and hyperactive bladder.  History of C. difficile in the last year and an aneurysm repair by Dr. Kyle Bobby.  Left hemiparesis  His medication reviewed and reconciled.  He takes amlodipine 2.5 mg a day, Eliquis 5 mg twice a day, atenolol 50 mg in the evening and 100 mg in the morning, atorvastatin 40 mg at bedtime or dinnertime.  Zetia 10 mg a day.  Glimepiride 1 tablet of 2 mg a day.  Hydralazine 10 mg twice a day.  Omeprazole 20 mg a day.  Tamsulosin 0.4 mg once a day and valsartan 320 mg a day.  Lives in [place] of [facility].  Who lives with his wife , lived there for 26 years.  Still working managing a foundation.  No known allergies.  No smoker and none alcoholic abuse.  Recently seen Dr. Davis for left leg problems and he believes was tested with low flow and that leg he may need a stent.  Patient Care Team:  Cooper Kong MD as PCP - General  Cooper Kong MD as PCP - [employer] PCP     Review of Systems  12 system review 12 system negative.  Ambulating with a cane  Objective   Vitals:  /78 (BP Location: Right arm, Patient Position: Sitting, BP Cuff Size: Adult)   Pulse 72   Temp 36.6 °C (97.8 °F) (Temporal)   Wt 88.9 kg (196 lb)   SpO2 93%   BMI 27.34 kg/m²       Physical Exam  Alert oriented pleasant cooperative with no distress.  Nonicteric sclera or jaundice.  Face symmetrical cranial nerves intact.  Neck supple no masses no lymph node no thyromegaly no jugular venous distention.  No carotid bruits.   Lungs diminished but clear no rales or wheezing.  Heart normal S1 and S2 regular rhythm.  Abdomen benign nontender no masses no organomegaly no pain on palpations.  No abdominal or aortic murmur or bruits.  Neurologically the same left hemiparesis normal speech equal strength on the right side comparing to very weak left side.  Skin intact.    Assessment & Plan  Difficulty walking    Orders:    Disability Placard    Type 2 diabetes mellitus without complication, without long-term current use of insulin    Orders:    Hemoglobin A1c    Hypertension, unspecified type    Orders:    Basic Metabolic Panel    Mixed hyperlipidemia    Orders:    Lipid Panel    Asymptomatic microscopic hematuria    Orders:    POCT UA (Automated) docked device    Routine general medical examination at health care facility    Orders:    1 Year Follow Up In Primary Care - Wellness Exam; Future    CVA, old, ataxia         Chronic atrial fibrillation (Multi)

## 2025-05-15 NOTE — PROGRESS NOTES
Subjective   Reason for Visit: Evangelist Infante is an 80 y.o. male here for a Medicare Wellness visit.          Reviewed all medications by prescribing practitioner or clinical pharmacist (such as prescriptions, OTCs, herbal therapies and supplements) and documented in the medical record.    HPI    Patient Care Team:  Cooper Kong MD as PCP - General  Cooper Kong MD as PCP - [employer] PCP     Review of Systems    Objective   Vitals:  /78 (BP Location: Right arm, Patient Position: Sitting, BP Cuff Size: Adult)   Pulse 72   Temp 36.6 °C (97.8 °F) (Temporal)   Wt 88.9 kg (196 lb)   SpO2 93%   BMI 27.34 kg/m²       Physical Exam    Assessment & Plan  Difficulty walking    Orders:    Disability Placard    Type 2 diabetes mellitus without complication, without long-term current use of insulin    Orders:    Hemoglobin A1c    Hypertension, unspecified type    Orders:    Basic Metabolic Panel    Mixed hyperlipidemia    Orders:    Lipid Panel    Asymptomatic microscopic hematuria    Orders:    POCT UA (Automated) docked device    Routine general medical examination at health care facility    Orders:    1 Year Follow Up In Primary Care - Wellness Exam; Future

## 2025-05-16 LAB
ANION GAP SERPL CALC-SCNC: 9 MMOL/L (ref 10–20)
BUN SERPL-MCNC: 27 MG/DL (ref 7–18)
CALCIUM SERPL-MCNC: 9.1 MG/DL (ref 8.5–10.1)
CHLORIDE SERPL-SCNC: 102 MMOL/L (ref 98–107)
CHOLEST SERPL-MCNC: 121 MG/DL (ref 0–199)
CHOLESTEROL/HDL RATIO: 2.2 (ref 4.2–7)
CO2 SERPL-SCNC: 29 MMOL/L (ref 21–32)
CREAT SERPL-MCNC: 1.21 MG/DL (ref 0.6–1.1)
EGFRCR SERPLBLD CKD-EPI 2021: 60 ML/MIN/1.73M*2
GLUCOSE SERPL-MCNC: 142 MG/DL (ref 74–100)
HBA1C MFR BLD: 6 %
HDLC SERPL-MCNC: 55 MG/DL (ref 40–59)
IS PATIENT FASTING: ABNORMAL
LDLC SERPL DIRECT ASSAY-MCNC: 52 MG/DL (ref 0–100)
POTASSIUM SERPL-SCNC: 4.4 MMOL/L (ref 3.5–5.1)
SODIUM SERPL-SCNC: 136 MMOL/L (ref 136–145)
TRIGL SERPL-MCNC: 70 MG/DL

## 2025-05-19 ENCOUNTER — TELEPHONE (OUTPATIENT)
Dept: PRIMARY CARE | Facility: CLINIC | Age: 80
End: 2025-05-19
Payer: MEDICARE

## 2025-05-20 NOTE — TELEPHONE ENCOUNTER
----- Message from Cooper Kong sent at 5/17/2025  3:41 PM EDT -----  Regarding: R  In this office revealed normal cholesterol triglyceride LDL, show normal lipid panel.  Blood sugar 142.  Kidney function in normal range normal electrolytes A1c 6 at the same level as prior.  Urinalysis showed trace of blood and tiny protein in it.  Has to be rechecked again in the near future when you come back to see me again.  You have any question please talk to the office or call me at the office

## 2025-07-07 DIAGNOSIS — E11.69 TYPE 2 DIABETES MELLITUS WITH OTHER SPECIFIED COMPLICATION, WITHOUT LONG-TERM CURRENT USE OF INSULIN: ICD-10-CM

## 2025-07-07 RX ORDER — BLOOD SUGAR DIAGNOSTIC
STRIP MISCELLANEOUS
Qty: 200 STRIP | Refills: 2 | Status: SHIPPED | OUTPATIENT
Start: 2025-07-07

## 2025-07-21 ENCOUNTER — TELEPHONE (OUTPATIENT)
Dept: PRIMARY CARE | Facility: CLINIC | Age: 80
End: 2025-07-21
Payer: MEDICARE

## 2025-07-28 ENCOUNTER — APPOINTMENT (OUTPATIENT)
Dept: PRIMARY CARE | Facility: CLINIC | Age: 80
End: 2025-07-28
Payer: MEDICARE

## 2025-07-28 VITALS
OXYGEN SATURATION: 93 % | TEMPERATURE: 97.9 F | WEIGHT: 196 LBS | BODY MASS INDEX: 27.34 KG/M2 | HEART RATE: 90 BPM | DIASTOLIC BLOOD PRESSURE: 88 MMHG | SYSTOLIC BLOOD PRESSURE: 163 MMHG

## 2025-07-28 DIAGNOSIS — I69.354 HEMIPARESIS OF LEFT NONDOMINANT SIDE AS LATE EFFECT OF CEREBRAL INFARCTION (MULTI): ICD-10-CM

## 2025-07-28 DIAGNOSIS — S91.301A WOUND OF RIGHT FOOT: Primary | ICD-10-CM

## 2025-07-28 DIAGNOSIS — E08.621 DIABETIC ULCER OF RIGHT MIDFOOT ASSOCIATED WITH DIABETES MELLITUS DUE TO UNDERLYING CONDITION, WITH MUSCLE INVOLVEMENT WITHOUT EVIDENCE OF NECROSIS: ICD-10-CM

## 2025-07-28 DIAGNOSIS — L97.415 DIABETIC ULCER OF RIGHT MIDFOOT ASSOCIATED WITH DIABETES MELLITUS DUE TO UNDERLYING CONDITION, WITH MUSCLE INVOLVEMENT WITHOUT EVIDENCE OF NECROSIS: ICD-10-CM

## 2025-07-28 PROCEDURE — 1036F TOBACCO NON-USER: CPT | Performed by: INTERNAL MEDICINE

## 2025-07-28 PROCEDURE — 3079F DIAST BP 80-89 MM HG: CPT | Performed by: INTERNAL MEDICINE

## 2025-07-28 PROCEDURE — 1160F RVW MEDS BY RX/DR IN RCRD: CPT | Performed by: INTERNAL MEDICINE

## 2025-07-28 PROCEDURE — 99214 OFFICE O/P EST MOD 30 MIN: CPT | Performed by: INTERNAL MEDICINE

## 2025-07-28 PROCEDURE — 3077F SYST BP >= 140 MM HG: CPT | Performed by: INTERNAL MEDICINE

## 2025-07-28 PROCEDURE — 1159F MED LIST DOCD IN RCRD: CPT | Performed by: INTERNAL MEDICINE

## 2025-07-28 ASSESSMENT — PATIENT HEALTH QUESTIONNAIRE - PHQ9
1. LITTLE INTEREST OR PLEASURE IN DOING THINGS: NOT AT ALL
2. FEELING DOWN, DEPRESSED OR HOPELESS: NOT AT ALL
SUM OF ALL RESPONSES TO PHQ9 QUESTIONS 1 AND 2: 0

## 2025-07-28 ASSESSMENT — ENCOUNTER SYMPTOMS
DEPRESSION: 0
OCCASIONAL FEELINGS OF UNSTEADINESS: 0
LOSS OF SENSATION IN FEET: 0

## 2025-07-28 NOTE — PROGRESS NOTES
Subjective   Patient ID: Evangelist Infante is a 80 y.o. male who presents for Foot Ulcer (Right foot ulcer).    HPI   80 years old male comes in to see me today because of wound on his right leg, the plantar side of the leg.  It healed in the last year but unfortunately opened up recently.  Foul-smelling with the necrosis of the skin covering it is visible to the naked eye.  Whitish skin.  No infection seen or no drainage and or no pus.  He walked on it for few days now almost 7 days.  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  Review of Systems  No other symptoms present on review of system.  Objective   /88 (BP Location: Right arm, Patient Position: Sitting, BP Cuff Size: Adult)   Pulse 90   Temp 36.6 °C (97.9 °F) (Temporal)   Wt 88.9 kg (196 lb)   SpO2 93%   BMI 27.34 kg/m²     Physical Exam  Wound on the plantar aspect of the right foot is purified and open.  Some of the skin was cut because it is dead without any feeling in it.  Wound was cleaned with peroxide and after drying it up the wound was covered with topical triple layer antibiotic.  A dressing was put on using gauze and on top of it pressure dressing on top show the wound dressing does not full.   Podiatry consultation was referred and ordered by Dr. Keon Starkey's  My cell phone number provided to patient in case he need to see me again to change the dressing.  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

## 2025-08-16 DIAGNOSIS — I10 HYPERTENSION, UNSPECIFIED TYPE: ICD-10-CM

## 2025-08-17 RX ORDER — AMLODIPINE BESYLATE 2.5 MG/1
2.5 TABLET ORAL
Qty: 100 TABLET | Refills: 2 | Status: SHIPPED | OUTPATIENT
Start: 2025-08-17

## 2025-10-01 ENCOUNTER — APPOINTMENT (OUTPATIENT)
Dept: GASTROENTEROLOGY | Facility: CLINIC | Age: 80
End: 2025-10-01
Payer: MEDICARE

## 2026-05-14 ENCOUNTER — APPOINTMENT (OUTPATIENT)
Dept: PRIMARY CARE | Facility: CLINIC | Age: 81
End: 2026-05-14
Payer: MEDICARE